# Patient Record
Sex: FEMALE | Race: WHITE | Employment: OTHER | URBAN - NONMETROPOLITAN AREA
[De-identification: names, ages, dates, MRNs, and addresses within clinical notes are randomized per-mention and may not be internally consistent; named-entity substitution may affect disease eponyms.]

---

## 2017-01-01 ENCOUNTER — HOSPITAL ENCOUNTER (OUTPATIENT)
Dept: MAMMOGRAPHY | Age: 82
Discharge: HOME | End: 2017-08-16
Payer: MEDICARE

## 2017-01-01 ENCOUNTER — CARE COORDINATION (OUTPATIENT)
Dept: CARE COORDINATION | Age: 82
End: 2017-01-01

## 2017-01-01 ENCOUNTER — HOSPITAL ENCOUNTER (OUTPATIENT)
Age: 82
Discharge: HOME | End: 2017-09-14
Payer: MEDICARE

## 2017-01-01 ENCOUNTER — HOSPITAL ENCOUNTER (OUTPATIENT)
Dept: GENERAL RADIOLOGY | Age: 82
Discharge: HOME | End: 2017-09-14
Payer: MEDICARE

## 2017-01-01 ENCOUNTER — TELEPHONE (OUTPATIENT)
Dept: FAMILY MEDICINE CLINIC | Age: 82
End: 2017-01-01

## 2017-01-01 ENCOUNTER — OFFICE VISIT (OUTPATIENT)
Dept: FAMILY MEDICINE CLINIC | Age: 82
End: 2017-01-01
Payer: MEDICARE

## 2017-01-01 ENCOUNTER — NURSE ONLY (OUTPATIENT)
Dept: FAMILY MEDICINE CLINIC | Age: 82
End: 2017-01-01
Payer: MEDICARE

## 2017-01-01 ENCOUNTER — OFFICE VISIT (OUTPATIENT)
Dept: FAMILY MEDICINE CLINIC | Age: 82
End: 2017-01-01

## 2017-01-01 ENCOUNTER — HOSPITAL ENCOUNTER (OUTPATIENT)
Age: 82
Discharge: HOME | End: 2017-08-16
Payer: MEDICARE

## 2017-01-01 VITALS
HEIGHT: 61 IN | RESPIRATION RATE: 16 BRPM | HEART RATE: 60 BPM | SYSTOLIC BLOOD PRESSURE: 124 MMHG | WEIGHT: 145.6 LBS | DIASTOLIC BLOOD PRESSURE: 70 MMHG | BODY MASS INDEX: 27.49 KG/M2

## 2017-01-01 VITALS — OXYGEN SATURATION: 95 % | HEART RATE: 70 BPM | RESPIRATION RATE: 18 BRPM

## 2017-01-01 VITALS
WEIGHT: 145 LBS | HEART RATE: 65 BPM | OXYGEN SATURATION: 87 % | HEIGHT: 61 IN | RESPIRATION RATE: 16 BRPM | SYSTOLIC BLOOD PRESSURE: 124 MMHG | DIASTOLIC BLOOD PRESSURE: 80 MMHG | BODY MASS INDEX: 27.38 KG/M2 | TEMPERATURE: 98.8 F

## 2017-01-01 VITALS
TEMPERATURE: 98 F | RESPIRATION RATE: 17 BRPM | OXYGEN SATURATION: 92 % | SYSTOLIC BLOOD PRESSURE: 112 MMHG | BODY MASS INDEX: 26.81 KG/M2 | DIASTOLIC BLOOD PRESSURE: 60 MMHG | WEIGHT: 142 LBS | HEART RATE: 56 BPM

## 2017-01-01 VITALS — RESPIRATION RATE: 18 BRPM | HEART RATE: 64 BPM | DIASTOLIC BLOOD PRESSURE: 70 MMHG | SYSTOLIC BLOOD PRESSURE: 140 MMHG

## 2017-01-01 VITALS
BODY MASS INDEX: 28.51 KG/M2 | HEIGHT: 61 IN | SYSTOLIC BLOOD PRESSURE: 128 MMHG | RESPIRATION RATE: 14 BRPM | DIASTOLIC BLOOD PRESSURE: 80 MMHG | WEIGHT: 151 LBS | HEART RATE: 60 BPM

## 2017-01-01 DIAGNOSIS — J01.11 ACUTE RECURRENT FRONTAL SINUSITIS: ICD-10-CM

## 2017-01-01 DIAGNOSIS — F01.50 VASCULAR DEMENTIA WITHOUT BEHAVIORAL DISTURBANCE (HCC): ICD-10-CM

## 2017-01-01 DIAGNOSIS — E78.00 PURE HYPERCHOLESTEROLEMIA: ICD-10-CM

## 2017-01-01 DIAGNOSIS — Z95.820 S/P ANGIOPLASTY WITH STENT: ICD-10-CM

## 2017-01-01 DIAGNOSIS — Z12.39 BREAST CANCER SCREENING: ICD-10-CM

## 2017-01-01 DIAGNOSIS — R79.89 ELEVATED TSH: ICD-10-CM

## 2017-01-01 DIAGNOSIS — N28.9 RENAL DYSFUNCTION: ICD-10-CM

## 2017-01-01 DIAGNOSIS — J40 BRONCHITIS: ICD-10-CM

## 2017-01-01 DIAGNOSIS — Z95.5 PRESENCE OF STENT IN LAD CORONARY ARTERY: ICD-10-CM

## 2017-01-01 DIAGNOSIS — J40 BRONCHITIS: Primary | ICD-10-CM

## 2017-01-01 DIAGNOSIS — I10 HTN (HYPERTENSION), BENIGN: ICD-10-CM

## 2017-01-01 DIAGNOSIS — I10 HTN (HYPERTENSION), BENIGN: Primary | ICD-10-CM

## 2017-01-01 DIAGNOSIS — J18.9 PNEUMONIA OF LEFT LOWER LOBE DUE TO INFECTIOUS ORGANISM: Primary | ICD-10-CM

## 2017-01-01 DIAGNOSIS — R79.81 ABNORMAL PULSE OXIMETRY: ICD-10-CM

## 2017-01-01 DIAGNOSIS — Z78.0 POSTMENOPAUSAL: ICD-10-CM

## 2017-01-01 DIAGNOSIS — I65.23 BILATERAL CAROTID ARTERY STENOSIS: ICD-10-CM

## 2017-01-01 DIAGNOSIS — D64.9 ANEMIA, UNSPECIFIED TYPE: Primary | ICD-10-CM

## 2017-01-01 DIAGNOSIS — L82.1 SEBORRHEIC KERATOSES: Primary | ICD-10-CM

## 2017-01-01 LAB
ALBUMIN SERPL-MCNC: 3.9 G/DL (ref 3.5–5.1)
ALP BLD-CCNC: 75 U/L (ref 38–126)
ALT SERPL-CCNC: 10 U/L (ref 11–66)
ANION GAP SERPL CALCULATED.3IONS-SCNC: 13 MEQ/L (ref 8–16)
AST SERPL-CCNC: 17 U/L (ref 5–40)
BILIRUB SERPL-MCNC: 0.4 MG/DL (ref 0.3–1.2)
BUN BLDV-MCNC: 24 MG/DL (ref 7–22)
CALCIUM SERPL-MCNC: 8.8 MG/DL (ref 8.5–10.5)
CHLORIDE BLD-SCNC: 99 MEQ/L (ref 98–111)
CO2: 26 MEQ/L (ref 23–33)
CREAT SERPL-MCNC: 1 MG/DL (ref 0.4–1.2)
GFR SERPL CREATININE-BSD FRML MDRD: 53 ML/MIN/1.73M2
GLUCOSE BLD-MCNC: 71 MG/DL (ref 70–108)
HCT VFR BLD CALC: 30.9 % (ref 37–47)
HEMOGLOBIN: 10 GM/DL (ref 12–16)
MCH RBC QN AUTO: 25.1 PG (ref 27–31)
MCHC RBC AUTO-ENTMCNC: 32.3 GM/DL (ref 33–37)
MCV RBC AUTO: 77.7 FL (ref 81–99)
PDW BLD-RTO: 18.4 % (ref 11.5–14.5)
PLATELET # BLD: 222 THOU/MM3 (ref 130–400)
PMV BLD AUTO: 9.8 MCM (ref 7.4–10.4)
POTASSIUM SERPL-SCNC: 4.5 MEQ/L (ref 3.5–5.2)
RBC # BLD: 3.97 MILL/MM3 (ref 4.2–5.4)
SODIUM BLD-SCNC: 138 MEQ/L (ref 135–145)
T4 FREE: 1.33 NG/DL (ref 0.93–1.76)
TOTAL PROTEIN: 6.4 G/DL (ref 6.1–8)
TSH SERPL DL<=0.05 MIU/L-ACNC: 5.53 UIU/ML (ref 0.4–4.2)
WBC # BLD: 6 THOU/MM3 (ref 4.8–10.8)

## 2017-01-01 PROCEDURE — 1036F TOBACCO NON-USER: CPT | Performed by: FAMILY MEDICINE

## 2017-01-01 PROCEDURE — G8419 CALC BMI OUT NRM PARAM NOF/U: HCPCS | Performed by: FAMILY MEDICINE

## 2017-01-01 PROCEDURE — G8427 DOCREV CUR MEDS BY ELIG CLIN: HCPCS | Performed by: FAMILY MEDICINE

## 2017-01-01 PROCEDURE — 71020 XR CHEST STANDARD TWO VW: CPT

## 2017-01-01 PROCEDURE — G8400 PT W/DXA NO RESULTS DOC: HCPCS | Performed by: FAMILY MEDICINE

## 2017-01-01 PROCEDURE — 99213 OFFICE O/P EST LOW 20 MIN: CPT | Performed by: FAMILY MEDICINE

## 2017-01-01 PROCEDURE — 99211 OFF/OP EST MAY X REQ PHY/QHP: CPT | Performed by: FAMILY MEDICINE

## 2017-01-01 PROCEDURE — 1123F ACP DISCUSS/DSCN MKR DOCD: CPT | Performed by: FAMILY MEDICINE

## 2017-01-01 PROCEDURE — 4040F PNEUMOC VAC/ADMIN/RCVD: CPT | Performed by: FAMILY MEDICINE

## 2017-01-01 PROCEDURE — G0202 SCR MAMMO BI INCL CAD: HCPCS

## 2017-01-01 PROCEDURE — 1090F PRES/ABSN URINE INCON ASSESS: CPT | Performed by: FAMILY MEDICINE

## 2017-01-01 PROCEDURE — G8598 ASA/ANTIPLAT THER USED: HCPCS | Performed by: FAMILY MEDICINE

## 2017-01-01 PROCEDURE — 36415 COLL VENOUS BLD VENIPUNCTURE: CPT

## 2017-01-01 PROCEDURE — 94640 AIRWAY INHALATION TREATMENT: CPT | Performed by: FAMILY MEDICINE

## 2017-01-01 PROCEDURE — 85027 COMPLETE CBC AUTOMATED: CPT

## 2017-01-01 PROCEDURE — 84443 ASSAY THYROID STIM HORMONE: CPT

## 2017-01-01 PROCEDURE — 99214 OFFICE O/P EST MOD 30 MIN: CPT | Performed by: FAMILY MEDICINE

## 2017-01-01 PROCEDURE — 17110 DESTRUCTION B9 LES UP TO 14: CPT | Performed by: FAMILY MEDICINE

## 2017-01-01 PROCEDURE — 84439 ASSAY OF FREE THYROXINE: CPT

## 2017-01-01 PROCEDURE — 80053 COMPREHEN METABOLIC PANEL: CPT

## 2017-01-01 PROCEDURE — 96372 THER/PROPH/DIAG INJ SC/IM: CPT | Performed by: FAMILY MEDICINE

## 2017-01-01 RX ORDER — ALBUTEROL SULFATE 2.5 MG/3ML
2.5 SOLUTION RESPIRATORY (INHALATION) ONCE
Status: COMPLETED | OUTPATIENT
Start: 2017-01-01 | End: 2017-01-01

## 2017-01-01 RX ORDER — LEVOFLOXACIN 250 MG/1
250 TABLET ORAL DAILY
Qty: 10 TABLET | Refills: 0 | Status: SHIPPED | OUTPATIENT
Start: 2017-01-01 | End: 2017-01-01 | Stop reason: ALTCHOICE

## 2017-01-01 RX ORDER — QUETIAPINE FUMARATE 100 MG/1
TABLET, FILM COATED ORAL
Qty: 30 TABLET | Refills: 1 | Status: SHIPPED | OUTPATIENT
Start: 2017-01-01 | End: 2017-01-01 | Stop reason: SDUPTHER

## 2017-01-01 RX ORDER — CEFTRIAXONE 1 G/1
1 INJECTION, POWDER, FOR SOLUTION INTRAMUSCULAR; INTRAVENOUS ONCE
Status: COMPLETED | OUTPATIENT
Start: 2017-01-01 | End: 2017-01-01

## 2017-01-01 RX ORDER — LISINOPRIL 20 MG/1
20 TABLET ORAL DAILY
Qty: 30 TABLET | Refills: 11 | Status: SHIPPED | OUTPATIENT
Start: 2017-01-01 | End: 2018-01-01 | Stop reason: SDUPTHER

## 2017-01-01 RX ORDER — ALPRAZOLAM 1 MG/1
0.5 TABLET ORAL 3 TIMES DAILY PRN
Qty: 30 TABLET | Refills: 0 | Status: ON HOLD | OUTPATIENT
Start: 2017-01-01 | End: 2018-01-01

## 2017-01-01 RX ORDER — ALPRAZOLAM 1 MG/1
0.5 TABLET ORAL 3 TIMES DAILY PRN
Qty: 30 TABLET | Refills: 0 | Status: SHIPPED | OUTPATIENT
Start: 2017-01-01 | End: 2017-01-01 | Stop reason: SDUPTHER

## 2017-01-01 RX ORDER — TRIAMCINOLONE ACETONIDE 40 MG/ML
40 INJECTION, SUSPENSION INTRA-ARTICULAR; INTRAMUSCULAR ONCE
Status: COMPLETED | OUTPATIENT
Start: 2017-01-01 | End: 2017-01-01

## 2017-01-01 RX ORDER — CLOPIDOGREL BISULFATE 75 MG/1
TABLET ORAL
Qty: 90 TABLET | Refills: 0 | Status: ON HOLD | OUTPATIENT
Start: 2017-01-01 | End: 2018-01-01 | Stop reason: HOSPADM

## 2017-01-01 RX ORDER — ISOSORBIDE MONONITRATE 30 MG/1
TABLET, EXTENDED RELEASE ORAL
Qty: 90 TABLET | Refills: 3 | Status: ON HOLD | OUTPATIENT
Start: 2017-01-01 | End: 2018-01-01

## 2017-01-01 RX ORDER — LORATADINE 10 MG/1
10 TABLET ORAL DAILY
Qty: 30 TABLET | Refills: 11 | Status: ON HOLD | OUTPATIENT
Start: 2017-01-01 | End: 2018-01-01

## 2017-01-01 RX ORDER — PREDNISONE 20 MG/1
40 TABLET ORAL DAILY
Qty: 10 TABLET | Refills: 0 | Status: SHIPPED | OUTPATIENT
Start: 2017-01-01 | End: 2017-01-01

## 2017-01-01 RX ADMIN — Medication 0.5 MG: at 15:38

## 2017-01-01 RX ADMIN — TRIAMCINOLONE ACETONIDE 40 MG: 40 INJECTION, SUSPENSION INTRA-ARTICULAR; INTRAMUSCULAR at 15:36

## 2017-01-01 RX ADMIN — CEFTRIAXONE 1 G: 1 INJECTION, POWDER, FOR SOLUTION INTRAMUSCULAR; INTRAVENOUS at 15:35

## 2017-01-01 RX ADMIN — ALBUTEROL SULFATE 2.5 MG: 2.5 SOLUTION RESPIRATORY (INHALATION) at 15:37

## 2017-01-31 ENCOUNTER — CARE COORDINATION (OUTPATIENT)
Dept: CARE COORDINATION | Age: 82
End: 2017-01-31

## 2017-02-07 ENCOUNTER — TELEPHONE (OUTPATIENT)
Dept: FAMILY MEDICINE CLINIC | Age: 82
End: 2017-02-07

## 2017-03-03 ENCOUNTER — CARE COORDINATION (OUTPATIENT)
Dept: CARE COORDINATION | Age: 82
End: 2017-03-03

## 2017-04-06 ENCOUNTER — CARE COORDINATION (OUTPATIENT)
Dept: CARE COORDINATION | Age: 82
End: 2017-04-06

## 2017-10-26 NOTE — TELEPHONE ENCOUNTER
Norma Canales calls complaining of being constipated for a week. She has tried OTC stool softeners and nothing is helping. She is asking for help. Please advise.     [address]  9/22/17

## 2017-11-02 NOTE — TELEPHONE ENCOUNTER
Lisa Arias calls stating for a couple weeks she has not been able to move her bowels. She said she has been taking Miralax and it is not doing anything and she cannot even \"dig it out\". She feels so full and is worried she may have a blocked bowel. She was willing to come in today but only wanted to see Dr Mar Morning (not in office). Please advise.

## 2017-12-05 NOTE — CARE COORDINATION
Attempted to reach patient for continued Care Coordination follow up and education. Patient was unavailable at the time of my call, and generic voicemail message was left asking patient to please return call to my direct number.   Jeane Herndon RN Care Coordinator

## 2017-12-15 NOTE — PROGRESS NOTES
Chief Complaint   Patient presents with    Blood Pressure Check       Vitals:    12/15/17 1133   BP: (!) 140/70   Pulse:    Resp:        No changes to med  If dizzy over the weekend then go to the ER  Call patient

## 2018-01-01 ENCOUNTER — ANESTHESIA EVENT (OUTPATIENT)
Dept: EMERGENCY DEPT | Age: 83
DRG: 023 | End: 2018-01-01
Payer: MEDICARE

## 2018-01-01 ENCOUNTER — HOSPITAL ENCOUNTER (INPATIENT)
Age: 83
LOS: 2 days | Discharge: HOSPICE | DRG: 023 | End: 2018-05-03
Attending: EMERGENCY MEDICINE | Admitting: INTERNAL MEDICINE
Payer: MEDICARE

## 2018-01-01 ENCOUNTER — CARE COORDINATION (OUTPATIENT)
Dept: CARE COORDINATION | Age: 83
End: 2018-01-01

## 2018-01-01 ENCOUNTER — NURSE ONLY (OUTPATIENT)
Dept: FAMILY MEDICINE CLINIC | Age: 83
End: 2018-01-01
Payer: MEDICARE

## 2018-01-01 ENCOUNTER — APPOINTMENT (OUTPATIENT)
Dept: GENERAL RADIOLOGY | Age: 83
DRG: 023 | End: 2018-01-01
Payer: MEDICARE

## 2018-01-01 ENCOUNTER — NURSE TRIAGE (OUTPATIENT)
Dept: ADMINISTRATIVE | Age: 83
End: 2018-01-01

## 2018-01-01 ENCOUNTER — HOSPITAL ENCOUNTER (INPATIENT)
Age: 83
LOS: 1 days | DRG: 066 | End: 2018-05-04
Attending: INTERNAL MEDICINE | Admitting: INTERNAL MEDICINE
Payer: MEDICARE

## 2018-01-01 ENCOUNTER — APPOINTMENT (OUTPATIENT)
Dept: CT IMAGING | Age: 83
DRG: 023 | End: 2018-01-01
Payer: MEDICARE

## 2018-01-01 ENCOUNTER — OFFICE VISIT (OUTPATIENT)
Dept: FAMILY MEDICINE CLINIC | Age: 83
End: 2018-01-01
Payer: MEDICARE

## 2018-01-01 ENCOUNTER — APPOINTMENT (OUTPATIENT)
Dept: MRI IMAGING | Age: 83
DRG: 377 | End: 2018-01-01
Payer: MEDICARE

## 2018-01-01 ENCOUNTER — APPOINTMENT (OUTPATIENT)
Dept: MRI IMAGING | Age: 83
DRG: 023 | End: 2018-01-01
Payer: MEDICARE

## 2018-01-01 ENCOUNTER — ANESTHESIA (OUTPATIENT)
Dept: ENDOSCOPY | Age: 83
DRG: 377 | End: 2018-01-01
Payer: MEDICARE

## 2018-01-01 ENCOUNTER — HOSPITAL ENCOUNTER (OUTPATIENT)
Dept: NON INVASIVE DIAGNOSTICS | Age: 83
Discharge: HOME | End: 2018-02-02
Payer: MEDICARE

## 2018-01-01 ENCOUNTER — HOSPITAL ENCOUNTER (INPATIENT)
Age: 83
LOS: 3 days | Discharge: HOME | DRG: 377 | End: 2018-01-12
Attending: FAMILY MEDICINE | Admitting: INTERNAL MEDICINE
Payer: MEDICARE

## 2018-01-01 ENCOUNTER — TELEPHONE (OUTPATIENT)
Dept: FAMILY MEDICINE CLINIC | Age: 83
End: 2018-01-01

## 2018-01-01 ENCOUNTER — APPOINTMENT (OUTPATIENT)
Dept: GENERAL RADIOLOGY | Age: 83
DRG: 377 | End: 2018-01-01
Payer: MEDICARE

## 2018-01-01 ENCOUNTER — OFFICE VISIT (OUTPATIENT)
Dept: CARDIOLOGY CLINIC | Age: 83
End: 2018-01-01
Payer: MEDICARE

## 2018-01-01 ENCOUNTER — ANESTHESIA (OUTPATIENT)
Dept: EMERGENCY DEPT | Age: 83
DRG: 023 | End: 2018-01-01
Payer: MEDICARE

## 2018-01-01 ENCOUNTER — HOSPITAL ENCOUNTER (OUTPATIENT)
Dept: INTERVENTIONAL RADIOLOGY/VASCULAR | Age: 83
Discharge: HOME | End: 2018-02-02
Payer: MEDICARE

## 2018-01-01 ENCOUNTER — APPOINTMENT (OUTPATIENT)
Dept: CT IMAGING | Age: 83
DRG: 377 | End: 2018-01-01
Payer: MEDICARE

## 2018-01-01 ENCOUNTER — ANESTHESIA EVENT (OUTPATIENT)
Dept: ENDOSCOPY | Age: 83
DRG: 377 | End: 2018-01-01
Payer: MEDICARE

## 2018-01-01 VITALS
HEART RATE: 86 BPM | BODY MASS INDEX: 25.61 KG/M2 | TEMPERATURE: 97.5 F | DIASTOLIC BLOOD PRESSURE: 38 MMHG | WEIGHT: 140 LBS | RESPIRATION RATE: 21 BRPM | OXYGEN SATURATION: 80 % | SYSTOLIC BLOOD PRESSURE: 141 MMHG

## 2018-01-01 VITALS
HEART RATE: 72 BPM | SYSTOLIC BLOOD PRESSURE: 150 MMHG | WEIGHT: 147 LBS | HEIGHT: 62 IN | DIASTOLIC BLOOD PRESSURE: 80 MMHG | BODY MASS INDEX: 27.05 KG/M2

## 2018-01-01 VITALS
DIASTOLIC BLOOD PRESSURE: 72 MMHG | HEART RATE: 62 BPM | BODY MASS INDEX: 24.7 KG/M2 | SYSTOLIC BLOOD PRESSURE: 180 MMHG | RESPIRATION RATE: 16 BRPM | WEIGHT: 148.4 LBS

## 2018-01-01 VITALS
DIASTOLIC BLOOD PRESSURE: 60 MMHG | BODY MASS INDEX: 25.87 KG/M2 | SYSTOLIC BLOOD PRESSURE: 110 MMHG | HEART RATE: 60 BPM | HEIGHT: 63 IN | WEIGHT: 146 LBS

## 2018-01-01 VITALS
HEART RATE: 61 BPM | SYSTOLIC BLOOD PRESSURE: 164 MMHG | WEIGHT: 142 LBS | BODY MASS INDEX: 23.66 KG/M2 | OXYGEN SATURATION: 95 % | HEIGHT: 65 IN | TEMPERATURE: 98.2 F | DIASTOLIC BLOOD PRESSURE: 70 MMHG | RESPIRATION RATE: 16 BRPM

## 2018-01-01 VITALS — OXYGEN SATURATION: 98 % | SYSTOLIC BLOOD PRESSURE: 132 MMHG | DIASTOLIC BLOOD PRESSURE: 74 MMHG

## 2018-01-01 VITALS
SYSTOLIC BLOOD PRESSURE: 130 MMHG | BODY MASS INDEX: 27.07 KG/M2 | DIASTOLIC BLOOD PRESSURE: 80 MMHG | WEIGHT: 148 LBS | HEART RATE: 72 BPM | OXYGEN SATURATION: 18 %

## 2018-01-01 VITALS
OXYGEN SATURATION: 85 % | TEMPERATURE: 97.4 F | RESPIRATION RATE: 20 BRPM | DIASTOLIC BLOOD PRESSURE: 45 MMHG | HEART RATE: 72 BPM | SYSTOLIC BLOOD PRESSURE: 81 MMHG

## 2018-01-01 VITALS — HEART RATE: 64 BPM | DIASTOLIC BLOOD PRESSURE: 70 MMHG | SYSTOLIC BLOOD PRESSURE: 158 MMHG | RESPIRATION RATE: 16 BRPM

## 2018-01-01 DIAGNOSIS — R94.31 EKG ABNORMALITIES: ICD-10-CM

## 2018-01-01 DIAGNOSIS — I10 HTN (HYPERTENSION), BENIGN: ICD-10-CM

## 2018-01-01 DIAGNOSIS — N30.00 ACUTE CYSTITIS WITHOUT HEMATURIA: ICD-10-CM

## 2018-01-01 DIAGNOSIS — Z95.820 S/P ANGIOPLASTY WITH STENT: ICD-10-CM

## 2018-01-01 DIAGNOSIS — N39.0 URINARY TRACT INFECTION WITHOUT HEMATURIA, SITE UNSPECIFIED: ICD-10-CM

## 2018-01-01 DIAGNOSIS — N28.9 RENAL DYSFUNCTION: ICD-10-CM

## 2018-01-01 DIAGNOSIS — R73.9 HYPERGLYCEMIA: ICD-10-CM

## 2018-01-01 DIAGNOSIS — I49.8 BRADYARRHYTHMIA: ICD-10-CM

## 2018-01-01 DIAGNOSIS — D50.8 OTHER IRON DEFICIENCY ANEMIA: ICD-10-CM

## 2018-01-01 DIAGNOSIS — H61.23 IMPACTED CERUMEN, BILATERAL: ICD-10-CM

## 2018-01-01 DIAGNOSIS — E03.9 ACQUIRED HYPOTHYROIDISM: ICD-10-CM

## 2018-01-01 DIAGNOSIS — I63.512 CEREBROVASCULAR ACCIDENT (CVA) DUE TO OCCLUSION OF LEFT MIDDLE CEREBRAL ARTERY (HCC): Primary | ICD-10-CM

## 2018-01-01 DIAGNOSIS — K25.4 GASTROINTESTINAL HEMORRHAGE ASSOCIATED WITH GASTRIC ULCER: ICD-10-CM

## 2018-01-01 DIAGNOSIS — G45.3 AMAUROSIS FUGAX: ICD-10-CM

## 2018-01-01 DIAGNOSIS — K92.2 GASTROINTESTINAL HEMORRHAGE, UNSPECIFIED GASTROINTESTINAL HEMORRHAGE TYPE: Primary | ICD-10-CM

## 2018-01-01 DIAGNOSIS — I65.23 BILATERAL CAROTID ARTERY STENOSIS: ICD-10-CM

## 2018-01-01 DIAGNOSIS — I25.10 CORONARY ARTERY DISEASE INVOLVING NATIVE CORONARY ARTERY OF NATIVE HEART WITHOUT ANGINA PECTORIS: ICD-10-CM

## 2018-01-01 DIAGNOSIS — K25.4 GASTROINTESTINAL HEMORRHAGE ASSOCIATED WITH GASTRIC ULCER: Primary | ICD-10-CM

## 2018-01-01 DIAGNOSIS — K92.0 GASTROINTESTINAL HEMORRHAGE WITH HEMATEMESIS: Primary | ICD-10-CM

## 2018-01-01 DIAGNOSIS — Z01.89 ROUTINE LAB DRAW: Primary | ICD-10-CM

## 2018-01-01 DIAGNOSIS — D50.0 BLOOD LOSS ANEMIA: ICD-10-CM

## 2018-01-01 DIAGNOSIS — I10 HYPERTENSION, UNCONTROLLED: ICD-10-CM

## 2018-01-01 DIAGNOSIS — I25.10 CORONARY ARTERY DISEASE INVOLVING NATIVE CORONARY ARTERY OF NATIVE HEART WITHOUT ANGINA PECTORIS: Primary | ICD-10-CM

## 2018-01-01 DIAGNOSIS — E78.00 PURE HYPERCHOLESTEROLEMIA: ICD-10-CM

## 2018-01-01 DIAGNOSIS — R41.0 CONFUSION: Primary | ICD-10-CM

## 2018-01-01 DIAGNOSIS — Z95.5 PRESENCE OF STENT IN LAD CORONARY ARTERY: Primary | ICD-10-CM

## 2018-01-01 LAB
ABO: NORMAL
ALBUMIN SERPL-MCNC: 2.5 GM/DL (ref 3.5–5)
ALBUMIN SERPL-MCNC: 2.8 G/DL (ref 3.5–5.1)
ALBUMIN SERPL-MCNC: 3.9 G/DL (ref 3.5–5.1)
ALBUMIN SERPL-MCNC: 4 G/DL (ref 3.5–5.1)
ALP BLD-CCNC: 52 U/L (ref 38–126)
ALP BLD-CCNC: 56 U/L (ref 46–116)
ALP BLD-CCNC: 63 U/L (ref 38–126)
ALP BLD-CCNC: 99 U/L (ref 38–126)
ALT SERPL-CCNC: 10 U/L (ref 11–66)
ALT SERPL-CCNC: 12 U/L (ref 11–66)
ALT SERPL-CCNC: 15 U/L (ref 12–78)
ALT SERPL-CCNC: 8 U/L (ref 11–66)
AMMONIA: 30 UMOL/L (ref 11–60)
AMORPHOUS: ABNORMAL
ANION GAP SERPL CALCULATED.3IONS-SCNC: 14 MEQ/L (ref 8–16)
ANION GAP SERPL CALCULATED.3IONS-SCNC: 15 MEQ/L (ref 8–16)
ANION GAP SERPL CALCULATED.3IONS-SCNC: 15 MEQ/L (ref 8–16)
ANION GAP: 7 MEQ/L (ref 8–16)
ANISOCYTOSIS: ABNORMAL
ANTIBODY SCREEN: NORMAL
APTT: 24 SECONDS (ref 22–38)
APTT: 26 SECONDS (ref 22–38)
APTT: 29.8 SECONDS (ref 22–38)
AST SERPL-CCNC: 13 U/L (ref 5–40)
AST SERPL-CCNC: 20 U/L (ref 5–40)
AST SERPL-CCNC: 21 U/L (ref 15–37)
AST SERPL-CCNC: 22 U/L (ref 5–40)
BACTERIA: ABNORMAL
BACTERIA: ABNORMAL /HPF
BASOPHILS # BLD: 0.3 %
BASOPHILS # BLD: 0.4 % (ref 0–3)
BASOPHILS # BLD: 0.5 %
BASOPHILS ABSOLUTE: 0 THOU/MM3 (ref 0–0.1)
BASOPHILS ABSOLUTE: 0 THOU/MM3 (ref 0–0.1)
BILIRUB SERPL-MCNC: 0.2 MG/DL (ref 0.3–1.2)
BILIRUB SERPL-MCNC: 0.3 MG/DL (ref 0.2–1)
BILIRUB SERPL-MCNC: 0.4 MG/DL (ref 0.3–1.2)
BILIRUB SERPL-MCNC: 1 MG/DL (ref 0.3–1.2)
BILIRUBIN DIRECT: < 0.2 MG/DL (ref 0–0.3)
BILIRUBIN DIRECT: < 0.2 MG/DL (ref 0–0.3)
BILIRUBIN URINE: ABNORMAL
BILIRUBIN URINE: NEGATIVE
BILIRUBIN URINE: NEGATIVE
BLOOD CULTURE, ROUTINE: NORMAL
BLOOD CULTURE, ROUTINE: NORMAL
BLOOD URINE, POC: NEGATIVE
BLOOD, URINE: ABNORMAL
BLOOD, URINE: ABNORMAL
BUN BLDV-MCNC: 15 MG/DL (ref 7–22)
BUN BLDV-MCNC: 19 MG/DL (ref 7–22)
BUN BLDV-MCNC: 21 MG/DL (ref 7–22)
BUN BLDV-MCNC: 26 MG/DL (ref 7–22)
BUN BLDV-MCNC: 30 MG/DL (ref 7–18)
CALCIUM IONIZED: 1.08 MMOL/L (ref 1.12–1.32)
CALCIUM SERPL-MCNC: 8 MG/DL (ref 8.5–10.5)
CALCIUM SERPL-MCNC: 8.8 MG/DL (ref 8.5–10.5)
CALCIUM SERPL-MCNC: 9.5 MG/DL (ref 8.5–10.5)
CALCIUM SERPL-MCNC: 9.7 MG/DL (ref 8.5–10.5)
CASTS 2: ABNORMAL /LPF
CASTS UA: ABNORMAL /LPF
CASTS UA: ABNORMAL /LPF
CHARACTER, URINE: ABNORMAL
CHARACTER, URINE: ABNORMAL
CHARACTER, URINE: CLEAR
CHLORIDE BLD-SCNC: 100 MEQ/L (ref 98–111)
CHLORIDE BLD-SCNC: 103 MEQ/L (ref 98–111)
CHLORIDE BLD-SCNC: 107 MEQ/L (ref 98–107)
CHLORIDE BLD-SCNC: 109 MEQ/L (ref 98–111)
CHLORIDE BLD-SCNC: 99 MEQ/L (ref 98–111)
CHOLESTEROL, TOTAL: 119 MG/DL (ref 100–199)
CO2: 20 MEQ/L (ref 23–33)
CO2: 25 MEQ/L (ref 23–33)
CO2: 26 MEQ/L (ref 23–33)
CO2: 27 MEQ/L (ref 21–32)
CO2: 27 MEQ/L (ref 23–33)
COLOR, URINE: ABNORMAL
COLOR: YELLOW
COLOR: YELLOW
CREAT SERPL-MCNC: 0.7 MG/DL (ref 0.4–1.2)
CREAT SERPL-MCNC: 0.8 MG/DL (ref 0.4–1.2)
CREAT SERPL-MCNC: 0.9 MG/DL (ref 0.4–1.2)
CREAT SERPL-MCNC: 1 MG/DL (ref 0.4–1.2)
CREAT SERPL-MCNC: 1 MG/DL (ref 0.6–1.1)
CRYSTALS, UA: ABNORMAL
CRYSTALS, UA: ABNORMAL
EKG ATRIAL RATE: 66 BPM
EKG ATRIAL RATE: 68 BPM
EKG ATRIAL RATE: 74 BPM
EKG P AXIS: 33 DEGREES
EKG P AXIS: 66 DEGREES
EKG P AXIS: 71 DEGREES
EKG P-R INTERVAL: 152 MS
EKG P-R INTERVAL: 156 MS
EKG P-R INTERVAL: 160 MS
EKG Q-T INTERVAL: 404 MS
EKG Q-T INTERVAL: 424 MS
EKG Q-T INTERVAL: 436 MS
EKG QRS DURATION: 82 MS
EKG QRS DURATION: 86 MS
EKG QRS DURATION: 90 MS
EKG QTC CALCULATION (BAZETT): 444 MS
EKG QTC CALCULATION (BAZETT): 448 MS
EKG QTC CALCULATION (BAZETT): 463 MS
EKG R AXIS: 13 DEGREES
EKG R AXIS: 30 DEGREES
EKG R AXIS: 4 DEGREES
EKG T AXIS: 53 DEGREES
EKG T AXIS: 62 DEGREES
EKG T AXIS: 68 DEGREES
EKG VENTRICULAR RATE: 66 BPM
EKG VENTRICULAR RATE: 68 BPM
EKG VENTRICULAR RATE: 74 BPM
EOSINOPHIL # BLD: 0.6 %
EOSINOPHIL # BLD: 1.7 %
EOSINOPHILS ABSOLUTE: 0.1 THOU/MM3 (ref 0–0.4)
EOSINOPHILS ABSOLUTE: 0.1 THOU/MM3 (ref 0–0.4)
EOSINOPHILS RELATIVE PERCENT: 3.9 % (ref 0–4)
EPITHELIAL CELLS, UA: ABNORMAL /HPF
EPITHELIAL CELLS, UA: ABNORMAL /HPF
GFR SERPL CREATININE-BSD FRML MDRD: 53 ML/MIN/1.73M2
GFR SERPL CREATININE-BSD FRML MDRD: 60 ML/MIN/1.73M2
GFR SERPL CREATININE-BSD FRML MDRD: 69 ML/MIN/1.73M2
GFR SERPL CREATININE-BSD FRML MDRD: 80 ML/MIN/1.73M2
GFR, ESTIMATED: 56 ML/MIN/1.73M2
GLUCOSE BLD-MCNC: 120 MG/DL (ref 70–108)
GLUCOSE BLD-MCNC: 125 MG/DL (ref 70–108)
GLUCOSE BLD-MCNC: 128 MG/DL (ref 70–108)
GLUCOSE BLD-MCNC: 130 MG/DL (ref 74–106)
GLUCOSE BLD-MCNC: 148 MG/DL (ref 70–108)
GLUCOSE BLD-MCNC: 171 MG/DL (ref 70–108)
GLUCOSE BLD-MCNC: 92 MG/DL (ref 70–108)
GLUCOSE URINE: NEGATIVE MG/DL
GLUCOSE URINE: NEGATIVE MG/DL
GLUCOSE, URINE: NEGATIVE MG/DL
HCT VFR BLD CALC: 21.5 % (ref 37–47)
HCT VFR BLD CALC: 21.6 % (ref 37–47)
HCT VFR BLD CALC: 27.1 % (ref 37–47)
HCT VFR BLD CALC: 27.5 % (ref 37–47)
HCT VFR BLD CALC: 28.2 % (ref 37–47)
HCT VFR BLD CALC: 28.4 % (ref 37–47)
HCT VFR BLD CALC: 28.4 % (ref 37–47)
HCT VFR BLD CALC: 29.1 % (ref 37–47)
HCT VFR BLD CALC: 29.4 % (ref 37–47)
HCT VFR BLD CALC: 29.7 % (ref 37–47)
HCT VFR BLD CALC: 30.9 % (ref 37–47)
HCT VFR BLD CALC: 31.4 % (ref 37–47)
HCT VFR BLD CALC: 32 % (ref 37–47)
HCT VFR BLD CALC: 36 % (ref 37–47)
HCT VFR BLD CALC: 40 % (ref 37–47)
HDLC SERPL-MCNC: 58 MG/DL
HEMOCCULT STL QL: POSITIVE
HEMOGLOBIN: 10 GM/DL (ref 12–16)
HEMOGLOBIN: 10.3 GM/DL (ref 12–16)
HEMOGLOBIN: 10.6 GM/DL (ref 12–16)
HEMOGLOBIN: 11.7 GM/DL (ref 12–16)
HEMOGLOBIN: 13.1 GM/DL (ref 12–16)
HEMOGLOBIN: 7 GM/DL (ref 12–16)
HEMOGLOBIN: 7.1 GM/DL (ref 12–16)
HEMOGLOBIN: 9 GM/DL (ref 12–16)
HEMOGLOBIN: 9.1 GM/DL (ref 12–16)
HEMOGLOBIN: 9.2 GM/DL (ref 12–16)
HEMOGLOBIN: 9.4 GM/DL (ref 12–16)
HEMOGLOBIN: 9.5 GM/DL (ref 12–16)
HEMOGLOBIN: 9.5 GM/DL (ref 12–16)
HEMOGLOBIN: 9.6 GM/DL (ref 12–16)
HEMOGLOBIN: 9.7 GM/DL (ref 12–16)
HEMOGLOBIN: 9.9 GM/DL (ref 12–16)
HYPOCHROMIA: ABNORMAL
INR BLD: 0.99 (ref 0.85–1.13)
INR BLD: 1.03 (ref 0.85–1.13)
INR BLD: 1.07 (ref 0.85–1.13)
IRON: 42 UG/DL (ref 50–170)
IRON: 47 UG/DL (ref 50–170)
KETONES, URINE: 15
KETONES, URINE: NEGATIVE
KETONES, URINE: NEGATIVE
LACTATE: 2.9 MMOL/L (ref 0.9–1.7)
LACTIC ACID: 1.1 MMOL/L (ref 0.5–2.2)
LDL CHOLESTEROL CALCULATED: 42 MG/DL
LEUKOCYTE CLUMPS, URINE: NEGATIVE
LEUKOCYTE ESTERASE, URINE: ABNORMAL
LEUKOCYTE ESTERASE, URINE: NEGATIVE
LV EF: 60 %
LVEF MODALITY: NORMAL
LYMPHOCYTES # BLD: 16.3 %
LYMPHOCYTES # BLD: 41.4 % (ref 15–47)
LYMPHOCYTES # BLD: 9.5 %
LYMPHOCYTES ABSOLUTE: 1 THOU/MM3 (ref 1–4.8)
LYMPHOCYTES ABSOLUTE: 1.1 THOU/MM3 (ref 1–4.8)
MCH RBC QN AUTO: 26.8 PG (ref 27–31)
MCH RBC QN AUTO: 27 PG (ref 27–31)
MCH RBC QN AUTO: 27.2 PG (ref 27–31)
MCH RBC QN AUTO: 27.4 PG (ref 27–31)
MCH RBC QN AUTO: 27.6 PG (ref 27–31)
MCH RBC QN AUTO: 28 PG (ref 27–31)
MCH RBC QN AUTO: 28.6 PG (ref 27–31)
MCH RBC QN AUTO: 28.8 PG (ref 27–31)
MCHC RBC AUTO-ENTMCNC: 32.2 GM/DL (ref 33–37)
MCHC RBC AUTO-ENTMCNC: 32.5 GM/DL (ref 33–37)
MCHC RBC AUTO-ENTMCNC: 32.5 GM/DL (ref 33–37)
MCHC RBC AUTO-ENTMCNC: 32.8 GM/DL (ref 33–37)
MCHC RBC AUTO-ENTMCNC: 32.9 GM/DL (ref 33–37)
MCHC RBC AUTO-ENTMCNC: 33 GM/DL (ref 33–37)
MCHC RBC AUTO-ENTMCNC: 33.2 GM/DL (ref 33–37)
MCHC RBC AUTO-ENTMCNC: 33.7 GM/DL (ref 33–37)
MCV RBC AUTO: 81.6 FL (ref 81–99)
MCV RBC AUTO: 82.9 FL (ref 81–99)
MCV RBC AUTO: 83.2 FL (ref 81–99)
MCV RBC AUTO: 84.5 FL (ref 81–99)
MCV RBC AUTO: 84.8 FL (ref 81–99)
MCV RBC AUTO: 85 FL (ref 81–99)
MCV RBC AUTO: 85.2 FL (ref 81–99)
MCV RBC AUTO: 87.4 FL (ref 81–99)
MISCELLANEOUS 2: ABNORMAL
MONOCYTES # BLD: 10 %
MONOCYTES # BLD: 6.4 %
MONOCYTES ABSOLUTE: 0.7 THOU/MM3 (ref 0.4–1.3)
MONOCYTES ABSOLUTE: 0.7 THOU/MM3 (ref 0.4–1.3)
MONOCYTES: 12 % (ref 0–12)
MUCUS: ABNORMAL
NITRITE, URINE: NEGATIVE
NITRITE, URINE: NEGATIVE
NITRITE, URINE: POSITIVE
NUCLEATED RED BLOOD CELLS: 0 /100 WBC
NUCLEATED RED BLOOD CELLS: 0 /100 WBC
ORGANISM: ABNORMAL
OSMOLALITY CALCULATION: 278.1 MOSMOL/KG (ref 275–300)
PDW BLD-RTO: 14.7 % (ref 11.5–14.5)
PDW BLD-RTO: 15.6 % (ref 11.5–14.5)
PDW BLD-RTO: 15.7 % (ref 11.5–14.5)
PDW BLD-RTO: 15.7 % (ref 11.5–14.5)
PDW BLD-RTO: 16 % (ref 11.5–14.5)
PDW BLD-RTO: 16.1 % (ref 11.5–14.5)
PDW BLD-RTO: 16.2 % (ref 11.5–14.5)
PDW BLD-RTO: 16.5 % (ref 11.5–14.5)
PH UA: 6.5 (ref 5–9)
PH UA: 7.5
PH, URINE: 6
PLATELET # BLD: 172 THOU/MM3 (ref 130–400)
PLATELET # BLD: 191 THOU/MM3 (ref 130–400)
PLATELET # BLD: 210 THOU/MM3 (ref 130–400)
PLATELET # BLD: 225 THOU/MM3 (ref 130–400)
PLATELET # BLD: 230 THOU/MM3 (ref 130–400)
PLATELET # BLD: 240 THOU/MM3 (ref 130–400)
PLATELET # BLD: 241 THOU/MM3 (ref 130–400)
PLATELET # BLD: 252 THOU/MM3 (ref 130–400)
PLATELET ESTIMATE: ABNORMAL
PMV BLD AUTO: 11.1 FL (ref 7.4–10.4)
PMV BLD AUTO: 8.1 MCM (ref 7.4–10.4)
PMV BLD AUTO: 8.8 MCM (ref 7.4–10.4)
PMV BLD AUTO: 8.9 MCM (ref 7.4–10.4)
PMV BLD AUTO: 9 MCM (ref 7.4–10.4)
PMV BLD AUTO: 9 MCM (ref 7.4–10.4)
PMV BLD AUTO: 9.3 MCM (ref 7.4–10.4)
PMV BLD AUTO: 9.8 FL (ref 7.4–10.4)
POC ACTIVATED CLOTTING TIME KAOLIN: 131 SECONDS (ref 1–150)
POC CALCIUM: 7.8 MG/DL (ref 8.5–10.1)
POIKILOCYTES: SLIGHT
POTASSIUM REFLEX MAGNESIUM: 3.8 MEQ/L (ref 3.5–5.2)
POTASSIUM SERPL-SCNC: 3.9 MEQ/L (ref 3.5–5.1)
POTASSIUM SERPL-SCNC: 3.9 MEQ/L (ref 3.5–5.2)
POTASSIUM SERPL-SCNC: 4 MEQ/L (ref 3.5–5.2)
POTASSIUM SERPL-SCNC: 4.2 MEQ/L (ref 3.5–5.2)
PROCALCITONIN: 0.09 NG/ML (ref 0.01–0.09)
PROTEIN UA: 30
PROTEIN UA: ABNORMAL MG/DL
PROTEIN, URINE: NEGATIVE MG/DL
RBC # BLD: 2.54 MILL/MM3 (ref 4.2–5.4)
RBC # BLD: 2.63 MILL/MM3 (ref 4.2–5.4)
RBC # BLD: 3.21 MILL/MM3 (ref 4.2–5.4)
RBC # BLD: 3.46 MILL/MM3 (ref 4.2–5.4)
RBC # BLD: 3.62 MILL/MM3 (ref 4.2–5.4)
RBC # BLD: 3.67 MILL/MM3 (ref 4.2–5.4)
RBC # BLD: 4.34 MILL/MM3 (ref 4.2–5.4)
RBC # BLD: 4.83 MILL/MM3 (ref 4.2–5.4)
RBC # BLD: ABNORMAL 10*6/UL
RBC UA: ABNORMAL /HPF
RBC URINE: ABNORMAL /HPF
REFLEX TO URINE C & S: ABNORMAL
RENAL EPITHELIAL, UA: ABNORMAL
RH FACTOR: NORMAL
SCAN OF BLOOD SMEAR: NORMAL
SEG NEUTROPHILS: 71.5 %
SEG NEUTROPHILS: 83.2 %
SEGMENTED NEUTROPHILS ABSOLUTE COUNT: 5 THOU/MM3 (ref 1.8–7.7)
SEGMENTED NEUTROPHILS ABSOLUTE COUNT: 8.5 THOU/MM3 (ref 1.8–7.7)
SEGS: 42.3 % (ref 43–75)
SODIUM BLD-SCNC: 138 MEQ/L (ref 135–145)
SODIUM BLD-SCNC: 141 MEQ/L (ref 136–145)
SODIUM BLD-SCNC: 142 MEQ/L (ref 135–145)
SODIUM BLD-SCNC: 142 MEQ/L (ref 135–145)
SODIUM BLD-SCNC: 144 MEQ/L (ref 135–145)
SPECIFIC GRAVITY UA: 1.02 (ref 1–1.03)
SPECIFIC GRAVITY, URINE: 1.01 (ref 1–1.03)
SPECIFIC GRAVITY, URINE: >= 1.03 (ref 1–1.03)
TOTAL PROTEIN: 4.6 G/DL (ref 6.1–8)
TOTAL PROTEIN: 5.3 GM/DL (ref 6.4–8.2)
TOTAL PROTEIN: 6.4 G/DL (ref 6.1–8)
TOTAL PROTEIN: 7.4 G/DL (ref 6.1–8)
TRIGL SERPL-MCNC: 95 MG/DL (ref 0–199)
TROPONIN I: 0.05 NG/ML
TROPONIN T: < 0.01 NG/ML
TSH SERPL DL<=0.05 MIU/L-ACNC: 0.88 UIU/ML (ref 0.4–4.2)
UREASE-CLO-C. PYLORI: NEGATIVE
URINE CULTURE REFLEX: ABNORMAL
UROBILINOGEN, URINE: 0.2 EU/DL
UROBILINOGEN, URINE: 0.2 EU/DL
UROBILINOGEN, URINE: 0.2 EU/DL (ref 0–1)
WBC # BLD: 10.2 THOU/MM3 (ref 4.8–10.8)
WBC # BLD: 3.9 THOU/MM3 (ref 4.8–10.8)
WBC # BLD: 4.3 THOU/MM3 (ref 4.8–10.8)
WBC # BLD: 4.7 THOU/MM3 (ref 4.8–10.8)
WBC # BLD: 5.2 THOU/MM3 (ref 4.8–10.8)
WBC # BLD: 5.3 THOU/MM3 (ref 4.8–10.8)
WBC # BLD: 5.6 THOU/MM3 (ref 4.8–10.8)
WBC # BLD: 7 THOU/MM3 (ref 4.8–10.8)
WBC UA: ABNORMAL /HPF
WBC UA: ABNORMAL /HPF
YEAST: ABNORMAL

## 2018-01-01 PROCEDURE — 85018 HEMOGLOBIN: CPT

## 2018-01-01 PROCEDURE — 2500000003 HC RX 250 WO HCPCS: Performed by: INTERNAL MEDICINE

## 2018-01-01 PROCEDURE — 2720000010 HC SURG SUPPLY STERILE

## 2018-01-01 PROCEDURE — 6360000002 HC RX W HCPCS

## 2018-01-01 PROCEDURE — 36415 COLL VENOUS BLD VENIPUNCTURE: CPT

## 2018-01-01 PROCEDURE — 3700000000 HC ANESTHESIA ATTENDED CARE: Performed by: INTERNAL MEDICINE

## 2018-01-01 PROCEDURE — 36223 PLACE CATH CAROTID/INOM ART: CPT | Performed by: PSYCHIATRY & NEUROLOGY

## 2018-01-01 PROCEDURE — 1200000003 HC TELEMETRY R&B

## 2018-01-01 PROCEDURE — 36430 TRANSFUSION BLD/BLD COMPNT: CPT

## 2018-01-01 PROCEDURE — 86850 RBC ANTIBODY SCREEN: CPT

## 2018-01-01 PROCEDURE — 93880 EXTRACRANIAL BILAT STUDY: CPT

## 2018-01-01 PROCEDURE — 2580000003 HC RX 258: Performed by: FAMILY MEDICINE

## 2018-01-01 PROCEDURE — 99232 SBSQ HOSP IP/OBS MODERATE 35: CPT | Performed by: INTERNAL MEDICINE

## 2018-01-01 PROCEDURE — P9016 RBC LEUKOCYTES REDUCED: HCPCS

## 2018-01-01 PROCEDURE — 93306 TTE W/DOPPLER COMPLETE: CPT

## 2018-01-01 PROCEDURE — C9113 INJ PANTOPRAZOLE SODIUM, VIA: HCPCS | Performed by: INTERNAL MEDICINE

## 2018-01-01 PROCEDURE — 85014 HEMATOCRIT: CPT

## 2018-01-01 PROCEDURE — 2580000003 HC RX 258: Performed by: PSYCHIATRY & NEUROLOGY

## 2018-01-01 PROCEDURE — 87186 SC STD MICRODIL/AGAR DIL: CPT

## 2018-01-01 PROCEDURE — 80076 HEPATIC FUNCTION PANEL: CPT

## 2018-01-01 PROCEDURE — 2500000003 HC RX 250 WO HCPCS: Performed by: SPECIALIST

## 2018-01-01 PROCEDURE — 99291 CRITICAL CARE FIRST HOUR: CPT | Performed by: INTERNAL MEDICINE

## 2018-01-01 PROCEDURE — 80061 LIPID PANEL: CPT

## 2018-01-01 PROCEDURE — 36415 COLL VENOUS BLD VENIPUNCTURE: CPT | Performed by: FAMILY MEDICINE

## 2018-01-01 PROCEDURE — 96360 HYDRATION IV INFUSION INIT: CPT

## 2018-01-01 PROCEDURE — 6370000000 HC RX 637 (ALT 250 FOR IP): Performed by: FAMILY MEDICINE

## 2018-01-01 PROCEDURE — 0DB68ZX EXCISION OF STOMACH, VIA NATURAL OR ARTIFICIAL OPENING ENDOSCOPIC, DIAGNOSTIC: ICD-10-PCS | Performed by: INTERNAL MEDICINE

## 2018-01-01 PROCEDURE — 6370000000 HC RX 637 (ALT 250 FOR IP): Performed by: INTERNAL MEDICINE

## 2018-01-01 PROCEDURE — 86677 HELICOBACTER PYLORI ANTIBODY: CPT

## 2018-01-01 PROCEDURE — 80048 BASIC METABOLIC PNL TOTAL CA: CPT

## 2018-01-01 PROCEDURE — 83605 ASSAY OF LACTIC ACID: CPT

## 2018-01-01 PROCEDURE — 36222 PLACE CATH CAROTID/INOM ART: CPT | Performed by: PSYCHIATRY & NEUROLOGY

## 2018-01-01 PROCEDURE — 71045 X-RAY EXAM CHEST 1 VIEW: CPT

## 2018-01-01 PROCEDURE — 84484 ASSAY OF TROPONIN QUANT: CPT

## 2018-01-01 PROCEDURE — A5120 SKIN BARRIER, WIPE OR SWAB: HCPCS

## 2018-01-01 PROCEDURE — 99285 EMERGENCY DEPT VISIT HI MDM: CPT

## 2018-01-01 PROCEDURE — 6360000002 HC RX W HCPCS: Performed by: INTERNAL MEDICINE

## 2018-01-01 PROCEDURE — 85610 PROTHROMBIN TIME: CPT

## 2018-01-01 PROCEDURE — 85730 THROMBOPLASTIN TIME PARTIAL: CPT

## 2018-01-01 PROCEDURE — 70551 MRI BRAIN STEM W/O DYE: CPT

## 2018-01-01 PROCEDURE — B31R1ZZ FLUOROSCOPY OF INTRACRANIAL ARTERIES USING LOW OSMOLAR CONTRAST: ICD-10-PCS | Performed by: PSYCHIATRY & NEUROLOGY

## 2018-01-01 PROCEDURE — 61645 PERQ ART M-THROMBECT &/NFS: CPT | Performed by: PSYCHIATRY & NEUROLOGY

## 2018-01-01 PROCEDURE — 93005 ELECTROCARDIOGRAM TRACING: CPT

## 2018-01-01 PROCEDURE — 82140 ASSAY OF AMMONIA: CPT

## 2018-01-01 PROCEDURE — 6360000002 HC RX W HCPCS: Performed by: PSYCHIATRY & NEUROLOGY

## 2018-01-01 PROCEDURE — B3171ZZ FLUOROSCOPY OF LEFT INTERNAL CAROTID ARTERY USING LOW OSMOLAR CONTRAST: ICD-10-PCS | Performed by: PSYCHIATRY & NEUROLOGY

## 2018-01-01 PROCEDURE — 70544 MR ANGIOGRAPHY HEAD W/O DYE: CPT

## 2018-01-01 PROCEDURE — 70450 CT HEAD/BRAIN W/O DYE: CPT

## 2018-01-01 PROCEDURE — 51701 INSERT BLADDER CATHETER: CPT

## 2018-01-01 PROCEDURE — 86900 BLOOD TYPING SEROLOGIC ABO: CPT

## 2018-01-01 PROCEDURE — 82948 REAGENT STRIP/BLOOD GLUCOSE: CPT

## 2018-01-01 PROCEDURE — 61635 INTRACRAN ANGIOPLSTY W/STENT: CPT

## 2018-01-01 PROCEDURE — 1200000000 HC SEMI PRIVATE

## 2018-01-01 PROCEDURE — 2000000000 HC ICU R&B

## 2018-01-01 PROCEDURE — 85027 COMPLETE CBC AUTOMATED: CPT

## 2018-01-01 PROCEDURE — 94003 VENT MGMT INPAT SUBQ DAY: CPT

## 2018-01-01 PROCEDURE — 03CG3ZZ EXTIRPATION OF MATTER FROM INTRACRANIAL ARTERY, PERCUTANEOUS APPROACH: ICD-10-PCS | Performed by: PSYCHIATRY & NEUROLOGY

## 2018-01-01 PROCEDURE — 99214 OFFICE O/P EST MOD 30 MIN: CPT | Performed by: FAMILY MEDICINE

## 2018-01-01 PROCEDURE — 87086 URINE CULTURE/COLONY COUNT: CPT

## 2018-01-01 PROCEDURE — C1769 GUIDE WIRE: HCPCS

## 2018-01-01 PROCEDURE — 6360000002 HC RX W HCPCS: Performed by: FAMILY MEDICINE

## 2018-01-01 PROCEDURE — 2700000000 HC OXYGEN THERAPY PER DAY

## 2018-01-01 PROCEDURE — 2580000003 HC RX 258: Performed by: INTERNAL MEDICINE

## 2018-01-01 PROCEDURE — B3121ZZ FLUOROSCOPY OF LEFT SUBCLAVIAN ARTERY USING LOW OSMOLAR CONTRAST: ICD-10-PCS | Performed by: PSYCHIATRY & NEUROLOGY

## 2018-01-01 PROCEDURE — 85347 COAGULATION TIME ACTIVATED: CPT

## 2018-01-01 PROCEDURE — 2500000003 HC RX 250 WO HCPCS: Performed by: ANESTHESIOLOGY

## 2018-01-01 PROCEDURE — 5A1945Z RESPIRATORY VENTILATION, 24-96 CONSECUTIVE HOURS: ICD-10-PCS | Performed by: PSYCHIATRY & NEUROLOGY

## 2018-01-01 PROCEDURE — 36223 PLACE CATH CAROTID/INOM ART: CPT

## 2018-01-01 PROCEDURE — 3430000000 HC RX DIAGNOSTIC RADIOPHARMACEUTICAL: Performed by: INTERNAL MEDICINE

## 2018-01-01 PROCEDURE — 74176 CT ABD & PELVIS W/O CONTRAST: CPT

## 2018-01-01 PROCEDURE — 94002 VENT MGMT INPAT INIT DAY: CPT

## 2018-01-01 PROCEDURE — A9500 TC99M SESTAMIBI: HCPCS | Performed by: INTERNAL MEDICINE

## 2018-01-01 PROCEDURE — 81003 URINALYSIS AUTO W/O SCOPE: CPT | Performed by: FAMILY MEDICINE

## 2018-01-01 PROCEDURE — C1887 CATHETER, GUIDING: HCPCS

## 2018-01-01 PROCEDURE — 82330 ASSAY OF CALCIUM: CPT

## 2018-01-01 PROCEDURE — 6360000002 HC RX W HCPCS: Performed by: ANESTHESIOLOGY

## 2018-01-01 PROCEDURE — 78452 HT MUSCLE IMAGE SPECT MULT: CPT

## 2018-01-01 PROCEDURE — 87077 CULTURE AEROBIC IDENTIFY: CPT

## 2018-01-01 PROCEDURE — C2628 CATHETER, OCCLUSION: HCPCS

## 2018-01-01 PROCEDURE — 86901 BLOOD TYPING SEROLOGIC RH(D): CPT

## 2018-01-01 PROCEDURE — 99213 OFFICE O/P EST LOW 20 MIN: CPT | Performed by: INTERNAL MEDICINE

## 2018-01-01 PROCEDURE — 86923 COMPATIBILITY TEST ELECTRIC: CPT

## 2018-01-01 PROCEDURE — 3700000001 HC ADD 15 MINUTES (ANESTHESIA): Performed by: INTERNAL MEDICINE

## 2018-01-01 PROCEDURE — 7100000000 HC PACU RECOVERY - FIRST 15 MIN: Performed by: INTERNAL MEDICINE

## 2018-01-01 PROCEDURE — 2780000010 HC IMPLANT OTHER

## 2018-01-01 PROCEDURE — 31500 INSERT EMERGENCY AIRWAY: CPT | Performed by: ANESTHESIOLOGY

## 2018-01-01 PROCEDURE — 037G3DZ DILATION OF INTRACRANIAL ARTERY WITH INTRALUMINAL DEVICE, PERCUTANEOUS APPROACH: ICD-10-PCS | Performed by: PSYCHIATRY & NEUROLOGY

## 2018-01-01 PROCEDURE — C1757 CATH, THROMBECTOMY/EMBOLECT: HCPCS

## 2018-01-01 PROCEDURE — 85025 COMPLETE CBC W/AUTO DIFF WBC: CPT

## 2018-01-01 PROCEDURE — 36215 PLACE CATHETER IN ARTERY: CPT

## 2018-01-01 PROCEDURE — 84145 PROCALCITONIN (PCT): CPT

## 2018-01-01 PROCEDURE — 6360000002 HC RX W HCPCS: Performed by: SPECIALIST

## 2018-01-01 PROCEDURE — 82272 OCCULT BLD FECES 1-3 TESTS: CPT

## 2018-01-01 PROCEDURE — 75710 ARTERY X-RAYS ARM/LEG: CPT

## 2018-01-01 PROCEDURE — 3609012400 HC EGD TRANSORAL BIOPSY SINGLE/MULTIPLE: Performed by: INTERNAL MEDICINE

## 2018-01-01 PROCEDURE — 93270 REMOTE 30 DAY ECG REV/REPORT: CPT

## 2018-01-01 PROCEDURE — 80053 COMPREHEN METABOLIC PANEL: CPT

## 2018-01-01 PROCEDURE — 82248 BILIRUBIN DIRECT: CPT

## 2018-01-01 PROCEDURE — C1894 INTRO/SHEATH, NON-LASER: HCPCS

## 2018-01-01 PROCEDURE — 81001 URINALYSIS AUTO W/SCOPE: CPT

## 2018-01-01 PROCEDURE — 0BH18EZ INSERTION OF ENDOTRACHEAL AIRWAY INTO TRACHEA, VIA NATURAL OR ARTIFICIAL OPENING ENDOSCOPIC: ICD-10-PCS | Performed by: PSYCHIATRY & NEUROLOGY

## 2018-01-01 PROCEDURE — 99223 1ST HOSP IP/OBS HIGH 75: CPT | Performed by: FAMILY MEDICINE

## 2018-01-01 PROCEDURE — 87040 BLOOD CULTURE FOR BACTERIA: CPT

## 2018-01-01 PROCEDURE — 6370000000 HC RX 637 (ALT 250 FOR IP)

## 2018-01-01 PROCEDURE — 36225 PLACE CATH SUBCLAVIAN ART: CPT | Performed by: PSYCHIATRY & NEUROLOGY

## 2018-01-01 PROCEDURE — 94640 AIRWAY INHALATION TREATMENT: CPT

## 2018-01-01 PROCEDURE — 7100000001 HC PACU RECOVERY - ADDTL 15 MIN: Performed by: INTERNAL MEDICINE

## 2018-01-01 PROCEDURE — 99239 HOSP IP/OBS DSCHRG MGMT >30: CPT | Performed by: INTERNAL MEDICINE

## 2018-01-01 PROCEDURE — 99496 TRANSJ CARE MGMT HIGH F2F 7D: CPT | Performed by: FAMILY MEDICINE

## 2018-01-01 PROCEDURE — 93017 CV STRESS TEST TRACING ONLY: CPT

## 2018-01-01 PROCEDURE — 99233 SBSQ HOSP IP/OBS HIGH 50: CPT | Performed by: INTERNAL MEDICINE

## 2018-01-01 PROCEDURE — 2500000003 HC RX 250 WO HCPCS: Performed by: PSYCHIATRY & NEUROLOGY

## 2018-01-01 PROCEDURE — 93005 ELECTROCARDIOGRAM TRACING: CPT | Performed by: EMERGENCY MEDICINE

## 2018-01-01 PROCEDURE — 2500000003 HC RX 250 WO HCPCS

## 2018-01-01 PROCEDURE — 36140 INTRO NDL ICATH UPR/LXTR ART: CPT

## 2018-01-01 PROCEDURE — 87184 SC STD DISK METHOD PER PLATE: CPT

## 2018-01-01 RX ORDER — PANTOPRAZOLE SODIUM 40 MG/1
40 TABLET, DELAYED RELEASE ORAL
Qty: 30 TABLET | Refills: 3 | Status: ON HOLD | OUTPATIENT
Start: 2018-01-01 | End: 2018-01-01

## 2018-01-01 RX ORDER — GLYCOPYRROLATE 0.2 MG/ML
0.2 INJECTION INTRAMUSCULAR; INTRAVENOUS EVERY 4 HOURS PRN
Status: CANCELLED | OUTPATIENT
Start: 2018-01-01

## 2018-01-01 RX ORDER — ONDANSETRON 2 MG/ML
4 INJECTION INTRAMUSCULAR; INTRAVENOUS EVERY 6 HOURS PRN
Status: DISCONTINUED | OUTPATIENT
Start: 2018-01-01 | End: 2018-01-01 | Stop reason: HOSPADM

## 2018-01-01 RX ORDER — MORPHINE SULFATE 4 MG/ML
3 INJECTION, SOLUTION INTRAMUSCULAR; INTRAVENOUS
Status: DISCONTINUED | OUTPATIENT
Start: 2018-01-01 | End: 2018-01-01

## 2018-01-01 RX ORDER — METOPROLOL SUCCINATE 50 MG/1
50 TABLET, EXTENDED RELEASE ORAL DAILY
Status: DISCONTINUED | OUTPATIENT
Start: 2018-01-01 | End: 2018-01-01

## 2018-01-01 RX ORDER — MORPHINE SULFATE/0.9% NACL/PF 1 MG/ML
SYRINGE (ML) INJECTION CONTINUOUS
Status: DISCONTINUED | OUTPATIENT
Start: 2018-01-01 | End: 2018-01-01 | Stop reason: HOSPADM

## 2018-01-01 RX ORDER — LEVOTHYROXINE SODIUM 0.1 MG/1
100 TABLET ORAL DAILY
Status: DISCONTINUED | OUTPATIENT
Start: 2018-01-01 | End: 2018-01-01

## 2018-01-01 RX ORDER — LEVOFLOXACIN 5 MG/ML
250 INJECTION, SOLUTION INTRAVENOUS
Status: DISCONTINUED | OUTPATIENT
Start: 2018-01-01 | End: 2018-01-01

## 2018-01-01 RX ORDER — LORAZEPAM 2 MG/ML
0.5 INJECTION INTRAMUSCULAR
Status: DISCONTINUED | OUTPATIENT
Start: 2018-01-01 | End: 2018-01-01 | Stop reason: HOSPADM

## 2018-01-01 RX ORDER — PROPOFOL 10 MG/ML
INJECTION, EMULSION INTRAVENOUS PRN
Status: DISCONTINUED | OUTPATIENT
Start: 2018-01-01 | End: 2018-01-01 | Stop reason: HOSPADM

## 2018-01-01 RX ORDER — PANTOPRAZOLE SODIUM 40 MG/10ML
40 INJECTION, POWDER, LYOPHILIZED, FOR SOLUTION INTRAVENOUS EVERY 8 HOURS
Status: DISCONTINUED | OUTPATIENT
Start: 2018-01-01 | End: 2018-01-01

## 2018-01-01 RX ORDER — 0.9 % SODIUM CHLORIDE 0.9 %
250 INTRAVENOUS SOLUTION INTRAVENOUS ONCE
Status: COMPLETED | OUTPATIENT
Start: 2018-01-01 | End: 2018-01-01

## 2018-01-01 RX ORDER — ATORVASTATIN CALCIUM 40 MG/1
40 TABLET, FILM COATED ORAL NIGHTLY
Status: DISCONTINUED | OUTPATIENT
Start: 2018-01-01 | End: 2018-01-01

## 2018-01-01 RX ORDER — ATORVASTATIN CALCIUM 10 MG/1
10 TABLET, FILM COATED ORAL NIGHTLY
Status: DISCONTINUED | OUTPATIENT
Start: 2018-01-01 | End: 2018-01-01 | Stop reason: HOSPADM

## 2018-01-01 RX ORDER — ONDANSETRON 2 MG/ML
4 INJECTION INTRAMUSCULAR; INTRAVENOUS EVERY 6 HOURS PRN
Status: CANCELLED | OUTPATIENT
Start: 2018-01-01

## 2018-01-01 RX ORDER — IPRATROPIUM BROMIDE AND ALBUTEROL SULFATE 2.5; .5 MG/3ML; MG/3ML
1 SOLUTION RESPIRATORY (INHALATION)
Status: DISCONTINUED | OUTPATIENT
Start: 2018-01-01 | End: 2018-01-01

## 2018-01-01 RX ORDER — ASPIRIN 300 MG/1
300 SUPPOSITORY RECTAL DAILY
Status: DISCONTINUED | OUTPATIENT
Start: 2018-01-01 | End: 2018-01-01

## 2018-01-01 RX ORDER — ONDANSETRON 2 MG/ML
4 INJECTION INTRAMUSCULAR; INTRAVENOUS EVERY 8 HOURS PRN
Status: DISCONTINUED | OUTPATIENT
Start: 2018-01-01 | End: 2018-01-01

## 2018-01-01 RX ORDER — LIDOCAINE HYDROCHLORIDE 20 MG/ML
INJECTION, SOLUTION INFILTRATION; PERINEURAL PRN
Status: DISCONTINUED | OUTPATIENT
Start: 2018-01-01 | End: 2018-01-01 | Stop reason: SDUPTHER

## 2018-01-01 RX ORDER — LISINOPRIL 20 MG/1
20 TABLET ORAL DAILY
Status: DISCONTINUED | OUTPATIENT
Start: 2018-01-01 | End: 2018-01-01 | Stop reason: HOSPADM

## 2018-01-01 RX ORDER — ISOSORBIDE MONONITRATE 30 MG/1
30 TABLET, EXTENDED RELEASE ORAL DAILY
Status: DISCONTINUED | OUTPATIENT
Start: 2018-01-01 | End: 2018-01-01 | Stop reason: HOSPADM

## 2018-01-01 RX ORDER — DEXMEDETOMIDINE HYDROCHLORIDE 4 UG/ML
0.2 INJECTION, SOLUTION INTRAVENOUS CONTINUOUS
Status: DISCONTINUED | OUTPATIENT
Start: 2018-01-01 | End: 2018-01-01 | Stop reason: SDUPTHER

## 2018-01-01 RX ORDER — SENNA PLUS 8.6 MG/1
1 TABLET ORAL 2 TIMES DAILY
Qty: 60 TABLET | Refills: 11 | Status: ON HOLD | OUTPATIENT
Start: 2018-01-01 | End: 2018-01-01

## 2018-01-01 RX ORDER — SODIUM CHLORIDE 0.9 % (FLUSH) 0.9 %
10 SYRINGE (ML) INJECTION PRN
Status: DISCONTINUED | OUTPATIENT
Start: 2018-01-01 | End: 2018-01-01 | Stop reason: HOSPADM

## 2018-01-01 RX ORDER — ACETAMINOPHEN 325 MG/1
650 TABLET ORAL EVERY 4 HOURS PRN
Status: DISCONTINUED | OUTPATIENT
Start: 2018-01-01 | End: 2018-01-01

## 2018-01-01 RX ORDER — SODIUM CHLORIDE 0.9 % (FLUSH) 0.9 %
10 SYRINGE (ML) INJECTION EVERY 12 HOURS SCHEDULED
Status: CANCELLED | OUTPATIENT
Start: 2018-01-01

## 2018-01-01 RX ORDER — ASPIRIN 300 MG/1
300 SUPPOSITORY RECTAL DAILY
Status: DISCONTINUED | OUTPATIENT
Start: 2018-01-01 | End: 2018-01-01 | Stop reason: SDUPTHER

## 2018-01-01 RX ORDER — HYDRALAZINE HYDROCHLORIDE 20 MG/ML
5 INJECTION INTRAMUSCULAR; INTRAVENOUS EVERY 4 HOURS PRN
Status: DISCONTINUED | OUTPATIENT
Start: 2018-01-01 | End: 2018-01-01 | Stop reason: HOSPADM

## 2018-01-01 RX ORDER — ACETAMINOPHEN 325 MG/1
650 TABLET ORAL EVERY 4 HOURS PRN
Status: DISCONTINUED | OUTPATIENT
Start: 2018-01-01 | End: 2018-01-01 | Stop reason: HOSPADM

## 2018-01-01 RX ORDER — MORPHINE SULFATE 4 MG/ML
3 INJECTION, SOLUTION INTRAMUSCULAR; INTRAVENOUS
Status: DISCONTINUED | OUTPATIENT
Start: 2018-01-01 | End: 2018-01-01 | Stop reason: HOSPADM

## 2018-01-01 RX ORDER — LISINOPRIL 30 MG/1
30 TABLET ORAL DAILY
Qty: 90 TABLET | Refills: 5 | Status: ON HOLD | OUTPATIENT
Start: 2018-01-01 | End: 2018-01-01

## 2018-01-01 RX ORDER — SENNA PLUS 8.6 MG/1
1 TABLET ORAL 2 TIMES DAILY
Status: DISCONTINUED | OUTPATIENT
Start: 2018-01-01 | End: 2018-01-01

## 2018-01-01 RX ORDER — MORPHINE SULFATE/0.9% NACL/PF 1 MG/ML
SYRINGE (ML) INJECTION CONTINUOUS
Status: CANCELLED | OUTPATIENT
Start: 2018-01-01

## 2018-01-01 RX ORDER — PANTOPRAZOLE SODIUM 40 MG/1
40 TABLET, DELAYED RELEASE ORAL
Status: DISCONTINUED | OUTPATIENT
Start: 2018-01-01 | End: 2018-01-01 | Stop reason: HOSPADM

## 2018-01-01 RX ORDER — PROPOFOL 10 MG/ML
10 INJECTION, EMULSION INTRAVENOUS
Status: DISCONTINUED | OUTPATIENT
Start: 2018-01-01 | End: 2018-01-01

## 2018-01-01 RX ORDER — CLOPIDOGREL BISULFATE 75 MG/1
75 TABLET ORAL DAILY
Status: DISCONTINUED | OUTPATIENT
Start: 2018-01-01 | End: 2018-01-01

## 2018-01-01 RX ORDER — ROCURONIUM BROMIDE 10 MG/ML
INJECTION, SOLUTION INTRAVENOUS PRN
Status: DISCONTINUED | OUTPATIENT
Start: 2018-01-01 | End: 2018-01-01 | Stop reason: HOSPADM

## 2018-01-01 RX ORDER — SODIUM CHLORIDE 0.9 % (FLUSH) 0.9 %
10 SYRINGE (ML) INJECTION EVERY 12 HOURS SCHEDULED
Status: DISCONTINUED | OUTPATIENT
Start: 2018-01-01 | End: 2018-01-01 | Stop reason: HOSPADM

## 2018-01-01 RX ORDER — MORPHINE SULFATE 2 MG/ML
2 INJECTION, SOLUTION INTRAMUSCULAR; INTRAVENOUS
Status: DISCONTINUED | OUTPATIENT
Start: 2018-01-01 | End: 2018-01-01 | Stop reason: HOSPADM

## 2018-01-01 RX ORDER — CETIRIZINE HYDROCHLORIDE 5 MG/1
5 TABLET ORAL DAILY
Status: DISCONTINUED | OUTPATIENT
Start: 2018-01-01 | End: 2018-01-01 | Stop reason: HOSPADM

## 2018-01-01 RX ORDER — GLYCOPYRROLATE 0.2 MG/ML
0.2 INJECTION INTRAMUSCULAR; INTRAVENOUS EVERY 4 HOURS PRN
Status: DISCONTINUED | OUTPATIENT
Start: 2018-01-01 | End: 2018-01-01 | Stop reason: HOSPADM

## 2018-01-01 RX ORDER — LANOLIN ALCOHOL/MO/W.PET/CERES
325 CREAM (GRAM) TOPICAL 2 TIMES DAILY
Qty: 60 TABLET | Refills: 11 | Status: ON HOLD | OUTPATIENT
Start: 2018-01-01 | End: 2018-01-01

## 2018-01-01 RX ORDER — LORATADINE 10 MG/1
10 TABLET ORAL DAILY
Status: DISCONTINUED | OUTPATIENT
Start: 2018-01-01 | End: 2018-01-01 | Stop reason: CLARIF

## 2018-01-01 RX ORDER — LEVOTHYROXINE SODIUM 0.1 MG/1
100 TABLET ORAL DAILY
Status: DISCONTINUED | OUTPATIENT
Start: 2018-01-01 | End: 2018-01-01 | Stop reason: HOSPADM

## 2018-01-01 RX ORDER — MORPHINE SULFATE 2 MG/ML
2 INJECTION, SOLUTION INTRAMUSCULAR; INTRAVENOUS
Status: DISCONTINUED | OUTPATIENT
Start: 2018-01-01 | End: 2018-01-01

## 2018-01-01 RX ORDER — QUETIAPINE FUMARATE 100 MG/1
100 TABLET, FILM COATED ORAL NIGHTLY
Status: DISCONTINUED | OUTPATIENT
Start: 2018-01-01 | End: 2018-01-01

## 2018-01-01 RX ORDER — FERROUS SULFATE 325(65) MG
325 TABLET ORAL 2 TIMES DAILY
Status: DISCONTINUED | OUTPATIENT
Start: 2018-01-01 | End: 2018-01-01

## 2018-01-01 RX ORDER — SODIUM CHLORIDE 9 MG/ML
INJECTION, SOLUTION INTRAVENOUS CONTINUOUS
Status: DISCONTINUED | OUTPATIENT
Start: 2018-01-01 | End: 2018-01-01

## 2018-01-01 RX ORDER — METOPROLOL SUCCINATE 50 MG/1
50 TABLET, EXTENDED RELEASE ORAL DAILY
Status: DISCONTINUED | OUTPATIENT
Start: 2018-01-01 | End: 2018-01-01 | Stop reason: HOSPADM

## 2018-01-01 RX ORDER — PROPOFOL 10 MG/ML
10 INJECTION, EMULSION INTRAVENOUS
Status: DISCONTINUED | OUTPATIENT
Start: 2018-01-01 | End: 2018-01-01 | Stop reason: SDUPTHER

## 2018-01-01 RX ORDER — LORAZEPAM 2 MG/ML
0.5 INJECTION INTRAMUSCULAR
Status: CANCELLED | OUTPATIENT
Start: 2018-01-01

## 2018-01-01 RX ORDER — HYDRALAZINE HYDROCHLORIDE 20 MG/ML
10 INJECTION INTRAMUSCULAR; INTRAVENOUS EVERY 6 HOURS PRN
Status: DISCONTINUED | OUTPATIENT
Start: 2018-01-01 | End: 2018-01-01

## 2018-01-01 RX ORDER — MORPHINE SULFATE 2 MG/ML
1 INJECTION, SOLUTION INTRAMUSCULAR; INTRAVENOUS
Status: DISCONTINUED | OUTPATIENT
Start: 2018-01-01 | End: 2018-01-01

## 2018-01-01 RX ORDER — ASPIRIN 81 MG/1
81 TABLET ORAL DAILY
Qty: 30 TABLET | Refills: 3 | Status: ON HOLD | OUTPATIENT
Start: 2018-01-01 | End: 2018-01-01

## 2018-01-01 RX ORDER — ATROPINE SULFATE 0.1 MG/ML
INJECTION INTRAVENOUS
Status: DISCONTINUED
Start: 2018-01-01 | End: 2018-01-01 | Stop reason: WASHOUT

## 2018-01-01 RX ORDER — DONEPEZIL HYDROCHLORIDE 10 MG/1
10 TABLET, FILM COATED ORAL NIGHTLY
Status: DISCONTINUED | OUTPATIENT
Start: 2018-01-01 | End: 2018-01-01 | Stop reason: HOSPADM

## 2018-01-01 RX ORDER — LIDOCAINE HYDROCHLORIDE 20 MG/ML
INJECTION, SOLUTION INFILTRATION; PERINEURAL PRN
Status: DISCONTINUED | OUTPATIENT
Start: 2018-01-01 | End: 2018-01-01 | Stop reason: HOSPADM

## 2018-01-01 RX ORDER — 0.9 % SODIUM CHLORIDE 0.9 %
500 INTRAVENOUS SOLUTION INTRAVENOUS ONCE
Status: COMPLETED | OUTPATIENT
Start: 2018-01-01 | End: 2018-01-01

## 2018-01-01 RX ORDER — LEVOTHYROXINE SODIUM 0.1 MG/1
TABLET ORAL
Qty: 90 TABLET | Refills: 0 | OUTPATIENT
Start: 2018-01-01

## 2018-01-01 RX ORDER — MORPHINE SULFATE 2 MG/ML
1 INJECTION, SOLUTION INTRAMUSCULAR; INTRAVENOUS
Status: DISCONTINUED | OUTPATIENT
Start: 2018-01-01 | End: 2018-01-01 | Stop reason: HOSPADM

## 2018-01-01 RX ORDER — QUETIAPINE FUMARATE 100 MG/1
100 TABLET, FILM COATED ORAL NIGHTLY
Status: DISCONTINUED | OUTPATIENT
Start: 2018-01-01 | End: 2018-01-01 | Stop reason: HOSPADM

## 2018-01-01 RX ORDER — SUCCINYLCHOLINE CHLORIDE 20 MG/ML
INJECTION INTRAMUSCULAR; INTRAVENOUS PRN
Status: DISCONTINUED | OUTPATIENT
Start: 2018-01-01 | End: 2018-01-01 | Stop reason: HOSPADM

## 2018-01-01 RX ORDER — DEXTROSE MONOHYDRATE 25 G/50ML
12.5 INJECTION, SOLUTION INTRAVENOUS PRN
Status: DISCONTINUED | OUTPATIENT
Start: 2018-01-01 | End: 2018-01-01

## 2018-01-01 RX ORDER — MORPHINE SULFATE 4 MG/ML
4 INJECTION, SOLUTION INTRAMUSCULAR; INTRAVENOUS
Status: DISCONTINUED | OUTPATIENT
Start: 2018-01-01 | End: 2018-01-01

## 2018-01-01 RX ORDER — IPRATROPIUM BROMIDE AND ALBUTEROL SULFATE 2.5; .5 MG/3ML; MG/3ML
1 SOLUTION RESPIRATORY (INHALATION) EVERY 4 HOURS PRN
Status: DISCONTINUED | OUTPATIENT
Start: 2018-01-01 | End: 2018-01-01 | Stop reason: HOSPADM

## 2018-01-01 RX ORDER — CLOPIDOGREL BISULFATE 75 MG/1
75 TABLET ORAL DAILY
Status: ON HOLD | COMMUNITY
End: 2018-01-01

## 2018-01-01 RX ORDER — LORAZEPAM 2 MG/ML
1 INJECTION INTRAMUSCULAR
Status: CANCELLED | OUTPATIENT
Start: 2018-01-01

## 2018-01-01 RX ORDER — SODIUM CHLORIDE 9 MG/ML
INJECTION, SOLUTION INTRAVENOUS CONTINUOUS
Status: DISCONTINUED | OUTPATIENT
Start: 2018-01-01 | End: 2018-01-01 | Stop reason: HOSPADM

## 2018-01-01 RX ORDER — IPRATROPIUM BROMIDE AND ALBUTEROL SULFATE 2.5; .5 MG/3ML; MG/3ML
1 SOLUTION RESPIRATORY (INHALATION) EVERY 4 HOURS PRN
Status: CANCELLED | OUTPATIENT
Start: 2018-01-01

## 2018-01-01 RX ORDER — ACETAMINOPHEN 650 MG/1
SUPPOSITORY RECTAL
Status: DISPENSED
Start: 2018-01-01 | End: 2018-01-01

## 2018-01-01 RX ORDER — NITROGLYCERIN 0.4 MG/1
0.4 TABLET SUBLINGUAL EVERY 5 MIN PRN
Status: DISCONTINUED | OUTPATIENT
Start: 2018-01-01 | End: 2018-01-01 | Stop reason: HOSPADM

## 2018-01-01 RX ORDER — LORAZEPAM 2 MG/ML
1 INJECTION INTRAMUSCULAR
Status: DISCONTINUED | OUTPATIENT
Start: 2018-01-01 | End: 2018-01-01 | Stop reason: HOSPADM

## 2018-01-01 RX ORDER — FENTANYL CITRATE 50 UG/ML
25 INJECTION, SOLUTION INTRAMUSCULAR; INTRAVENOUS EVERY 30 MIN PRN
Status: DISCONTINUED | OUTPATIENT
Start: 2018-01-01 | End: 2018-01-01

## 2018-01-01 RX ORDER — SODIUM CHLORIDE 9 MG/ML
INJECTION, SOLUTION INTRAVENOUS CONTINUOUS
Status: CANCELLED | OUTPATIENT
Start: 2018-01-01

## 2018-01-01 RX ORDER — HYDROCODONE BITARTRATE AND ACETAMINOPHEN 5; 325 MG/1; MG/1
1 TABLET ORAL EVERY 4 HOURS PRN
Status: DISCONTINUED | OUTPATIENT
Start: 2018-01-01 | End: 2018-01-01

## 2018-01-01 RX ORDER — LEVOFLOXACIN 5 MG/ML
500 INJECTION, SOLUTION INTRAVENOUS EVERY 24 HOURS
Status: DISCONTINUED | OUTPATIENT
Start: 2018-01-01 | End: 2018-01-01 | Stop reason: ALTCHOICE

## 2018-01-01 RX ORDER — NITROGLYCERIN 400 UG/1
1 SPRAY ORAL EVERY 5 MIN PRN
Status: DISCONTINUED | OUTPATIENT
Start: 2018-01-01 | End: 2018-01-01 | Stop reason: CLARIF

## 2018-01-01 RX ORDER — LEVOFLOXACIN 5 MG/ML
500 INJECTION, SOLUTION INTRAVENOUS ONCE
Status: COMPLETED | OUTPATIENT
Start: 2018-01-01 | End: 2018-01-01

## 2018-01-01 RX ORDER — MIDAZOLAM HYDROCHLORIDE 1 MG/ML
INJECTION INTRAMUSCULAR; INTRAVENOUS PRN
Status: DISCONTINUED | OUTPATIENT
Start: 2018-01-01 | End: 2018-01-01 | Stop reason: HOSPADM

## 2018-01-01 RX ORDER — NICOTINE POLACRILEX 4 MG
15 LOZENGE BUCCAL PRN
Status: DISCONTINUED | OUTPATIENT
Start: 2018-01-01 | End: 2018-01-01

## 2018-01-01 RX ORDER — VENLAFAXINE HYDROCHLORIDE 150 MG/1
150 CAPSULE, EXTENDED RELEASE ORAL
Status: DISCONTINUED | OUTPATIENT
Start: 2018-01-01 | End: 2018-01-01 | Stop reason: HOSPADM

## 2018-01-01 RX ORDER — MORPHINE SULFATE 4 MG/ML
4 INJECTION, SOLUTION INTRAMUSCULAR; INTRAVENOUS
Status: DISCONTINUED | OUTPATIENT
Start: 2018-01-01 | End: 2018-01-01 | Stop reason: HOSPADM

## 2018-01-01 RX ORDER — PANTOPRAZOLE SODIUM 40 MG/10ML
40 INJECTION, POWDER, LYOPHILIZED, FOR SOLUTION INTRAVENOUS DAILY
Status: DISCONTINUED | OUTPATIENT
Start: 2018-01-01 | End: 2018-01-01

## 2018-01-01 RX ORDER — DEXTROSE MONOHYDRATE 50 MG/ML
100 INJECTION, SOLUTION INTRAVENOUS PRN
Status: DISCONTINUED | OUTPATIENT
Start: 2018-01-01 | End: 2018-01-01

## 2018-01-01 RX ORDER — SODIUM CHLORIDE 0.9 % (FLUSH) 0.9 %
10 SYRINGE (ML) INJECTION PRN
Status: CANCELLED | OUTPATIENT
Start: 2018-01-01

## 2018-01-01 RX ADMIN — HYDRALAZINE HYDROCHLORIDE 10 MG: 20 INJECTION INTRAMUSCULAR; INTRAVENOUS at 08:32

## 2018-01-01 RX ADMIN — CEFTRIAXONE SODIUM 1 G: 10 INJECTION, POWDER, FOR SOLUTION INTRAVENOUS at 09:20

## 2018-01-01 RX ADMIN — VENLAFAXINE HYDROCHLORIDE 150 MG: 150 CAPSULE, EXTENDED RELEASE ORAL at 08:36

## 2018-01-01 RX ADMIN — PROPOFOL 10 MCG/KG/MIN: 10 INJECTION, EMULSION INTRAVENOUS at 22:00

## 2018-01-01 RX ADMIN — SODIUM CHLORIDE: 9 INJECTION, SOLUTION INTRAVENOUS at 11:20

## 2018-01-01 RX ADMIN — HYDRALAZINE HYDROCHLORIDE 5 MG: 20 INJECTION INTRAMUSCULAR; INTRAVENOUS at 19:43

## 2018-01-01 RX ADMIN — METOPROLOL SUCCINATE 50 MG: 50 TABLET, FILM COATED, EXTENDED RELEASE ORAL at 08:54

## 2018-01-01 RX ADMIN — LEVOFLOXACIN 500 MG: 5 INJECTION, SOLUTION INTRAVENOUS at 02:05

## 2018-01-01 RX ADMIN — FENTANYL CITRATE 25 MCG: 50 INJECTION, SOLUTION INTRAMUSCULAR; INTRAVENOUS at 04:10

## 2018-01-01 RX ADMIN — MORPHINE SULFATE 2 MG: 2 INJECTION, SOLUTION INTRAMUSCULAR; INTRAVENOUS at 11:21

## 2018-01-01 RX ADMIN — LIDOCAINE HYDROCHLORIDE 100 MG: 20 INJECTION, SOLUTION INFILTRATION; PERINEURAL at 19:15

## 2018-01-01 RX ADMIN — Medication 10 ML: at 09:31

## 2018-01-01 RX ADMIN — SODIUM CHLORIDE 250 ML: 9 INJECTION, SOLUTION INTRAVENOUS at 01:49

## 2018-01-01 RX ADMIN — MORPHINE SULFATE 30 MG: 1 INJECTION INTRAVENOUS at 18:23

## 2018-01-01 RX ADMIN — CETIRIZINE HYDROCHLORIDE 5 MG: 5 TABLET, FILM COATED ORAL at 08:36

## 2018-01-01 RX ADMIN — MORPHINE SULFATE 3 MG: 4 INJECTION INTRAVENOUS at 01:39

## 2018-01-01 RX ADMIN — FENTANYL CITRATE 25 MCG: 50 INJECTION, SOLUTION INTRAMUSCULAR; INTRAVENOUS at 01:05

## 2018-01-01 RX ADMIN — GLYCOPYRROLATE 0.2 MG: 0.2 INJECTION, SOLUTION INTRAMUSCULAR; INTRAVENOUS at 01:42

## 2018-01-01 RX ADMIN — LEVOTHYROXINE SODIUM 100 MCG: 100 TABLET ORAL at 04:12

## 2018-01-01 RX ADMIN — MORPHINE SULFATE 0.5 MG: 2 INJECTION, SOLUTION INTRAMUSCULAR; INTRAVENOUS at 13:42

## 2018-01-01 RX ADMIN — QUETIAPINE FUMARATE 100 MG: 100 TABLET ORAL at 23:36

## 2018-01-01 RX ADMIN — PANTOPRAZOLE SODIUM 40 MG: 40 INJECTION, POWDER, FOR SOLUTION INTRAVENOUS at 08:36

## 2018-01-01 RX ADMIN — ATORVASTATIN CALCIUM 10 MG: 10 TABLET, FILM COATED ORAL at 22:01

## 2018-01-01 RX ADMIN — SUCCINYLCHOLINE CHLORIDE 100 MG: 20 INJECTION, SOLUTION INTRAMUSCULAR; INTRAVENOUS at 19:15

## 2018-01-01 RX ADMIN — HYDRALAZINE HYDROCHLORIDE 10 MG: 20 INJECTION INTRAMUSCULAR; INTRAVENOUS at 01:05

## 2018-01-01 RX ADMIN — CEFTRIAXONE 1 G: 1 INJECTION, POWDER, FOR SOLUTION INTRAMUSCULAR; INTRAVENOUS at 21:02

## 2018-01-01 RX ADMIN — IPRATROPIUM BROMIDE AND ALBUTEROL SULFATE 1 AMPULE: .5; 3 SOLUTION RESPIRATORY (INHALATION) at 16:25

## 2018-01-01 RX ADMIN — Medication 32 MILLICURIE: at 10:25

## 2018-01-01 RX ADMIN — SODIUM CHLORIDE: 9 INJECTION, SOLUTION INTRAVENOUS at 22:03

## 2018-01-01 RX ADMIN — SODIUM CHLORIDE: 9 INJECTION, SOLUTION INTRAVENOUS at 01:50

## 2018-01-01 RX ADMIN — PROPOFOL 70 MG: 10 INJECTION, EMULSION INTRAVENOUS at 13:14

## 2018-01-01 RX ADMIN — DEXMEDETOMIDINE HYDROCHLORIDE 0.4 MCG/KG/HR: 100 INJECTION, SOLUTION INTRAVENOUS at 22:00

## 2018-01-01 RX ADMIN — PANTOPRAZOLE SODIUM 40 MG: 40 INJECTION, POWDER, FOR SOLUTION INTRAVENOUS at 02:06

## 2018-01-01 RX ADMIN — FENTANYL CITRATE 25 MCG: 50 INJECTION, SOLUTION INTRAMUSCULAR; INTRAVENOUS at 06:05

## 2018-01-01 RX ADMIN — FENTANYL CITRATE 25 MCG: 50 INJECTION, SOLUTION INTRAMUSCULAR; INTRAVENOUS at 08:45

## 2018-01-01 RX ADMIN — ATORVASTATIN CALCIUM 10 MG: 10 TABLET, FILM COATED ORAL at 22:03

## 2018-01-01 RX ADMIN — DONEPEZIL HYDROCHLORIDE 10 MG: 10 TABLET, FILM COATED ORAL at 02:06

## 2018-01-01 RX ADMIN — PANTOPRAZOLE SODIUM 40 MG: 40 TABLET, DELAYED RELEASE ORAL at 05:23

## 2018-01-01 RX ADMIN — SODIUM CHLORIDE 500 ML: 9 INJECTION, SOLUTION INTRAVENOUS at 19:53

## 2018-01-01 RX ADMIN — LEVOTHYROXINE SODIUM 100 MCG: 100 TABLET ORAL at 05:00

## 2018-01-01 RX ADMIN — HYDRALAZINE HYDROCHLORIDE 5 MG: 20 INJECTION INTRAMUSCULAR; INTRAVENOUS at 14:17

## 2018-01-01 RX ADMIN — MORPHINE SULFATE 2 MG: 2 INJECTION, SOLUTION INTRAMUSCULAR; INTRAVENOUS at 11:41

## 2018-01-01 RX ADMIN — MORPHINE SULFATE 2 MG: 4 INJECTION INTRAVENOUS at 12:29

## 2018-01-01 RX ADMIN — MORPHINE SULFATE 2 MG: 2 INJECTION, SOLUTION INTRAMUSCULAR; INTRAVENOUS at 16:10

## 2018-01-01 RX ADMIN — Medication 10 ML: at 21:00

## 2018-01-01 RX ADMIN — ISOSORBIDE MONONITRATE 30 MG: 30 TABLET ORAL at 09:31

## 2018-01-01 RX ADMIN — IPRATROPIUM BROMIDE AND ALBUTEROL SULFATE 1 AMPULE: .5; 3 SOLUTION RESPIRATORY (INHALATION) at 08:55

## 2018-01-01 RX ADMIN — MORPHINE SULFATE 2 MG: 2 INJECTION, SOLUTION INTRAMUSCULAR; INTRAVENOUS at 08:48

## 2018-01-01 RX ADMIN — QUETIAPINE FUMARATE 100 MG: 100 TABLET ORAL at 02:06

## 2018-01-01 RX ADMIN — Medication 9.3 MILLICURIE: at 09:30

## 2018-01-01 RX ADMIN — ATORVASTATIN CALCIUM 10 MG: 10 TABLET, FILM COATED ORAL at 02:06

## 2018-01-01 RX ADMIN — METOPROLOL SUCCINATE 50 MG: 50 TABLET, FILM COATED, EXTENDED RELEASE ORAL at 09:32

## 2018-01-01 RX ADMIN — CEFTRIAXONE SODIUM 1 G: 10 INJECTION, POWDER, FOR SOLUTION INTRAVENOUS at 09:29

## 2018-01-01 RX ADMIN — MORPHINE SULFATE 1 MG: 2 INJECTION, SOLUTION INTRAMUSCULAR; INTRAVENOUS at 14:44

## 2018-01-01 RX ADMIN — IPRATROPIUM BROMIDE AND ALBUTEROL SULFATE 1 AMPULE: .5; 3 SOLUTION RESPIRATORY (INHALATION) at 12:38

## 2018-01-01 RX ADMIN — LISINOPRIL 20 MG: 20 TABLET ORAL at 08:36

## 2018-01-01 RX ADMIN — LISINOPRIL 20 MG: 20 TABLET ORAL at 08:54

## 2018-01-01 RX ADMIN — LEVOTHYROXINE SODIUM 100 MCG: 100 TABLET ORAL at 05:22

## 2018-01-01 RX ADMIN — Medication 10 ML: at 10:15

## 2018-01-01 RX ADMIN — DONEPEZIL HYDROCHLORIDE 10 MG: 10 TABLET, FILM COATED ORAL at 22:01

## 2018-01-01 RX ADMIN — LISINOPRIL 20 MG: 20 TABLET ORAL at 09:31

## 2018-01-01 RX ADMIN — PANTOPRAZOLE SODIUM 40 MG: 40 INJECTION, POWDER, FOR SOLUTION INTRAVENOUS at 01:43

## 2018-01-01 RX ADMIN — GLYCOPYRROLATE 0.2 MG: 0.2 INJECTION, SOLUTION INTRAMUSCULAR; INTRAVENOUS at 11:43

## 2018-01-01 RX ADMIN — CEFTRIAXONE SODIUM 1 G: 10 INJECTION, POWDER, FOR SOLUTION INTRAVENOUS at 11:24

## 2018-01-01 RX ADMIN — CETIRIZINE HYDROCHLORIDE 5 MG: 5 TABLET, FILM COATED ORAL at 09:32

## 2018-01-01 RX ADMIN — LORAZEPAM 1 MG: 2 INJECTION INTRAMUSCULAR; INTRAVENOUS at 11:41

## 2018-01-01 RX ADMIN — SODIUM CHLORIDE: 9 INJECTION, SOLUTION INTRAVENOUS at 06:27

## 2018-01-01 RX ADMIN — PANTOPRAZOLE SODIUM 40 MG: 40 INJECTION, POWDER, FOR SOLUTION INTRAVENOUS at 10:28

## 2018-01-01 RX ADMIN — CETIRIZINE HYDROCHLORIDE 5 MG: 5 TABLET, FILM COATED ORAL at 08:54

## 2018-01-01 RX ADMIN — Medication 10 ML: at 22:01

## 2018-01-01 RX ADMIN — METOPROLOL SUCCINATE 50 MG: 50 TABLET, FILM COATED, EXTENDED RELEASE ORAL at 08:36

## 2018-01-01 RX ADMIN — LIDOCAINE HYDROCHLORIDE 100 MG: 20 INJECTION, SOLUTION INFILTRATION; PERINEURAL at 13:14

## 2018-01-01 RX ADMIN — SODIUM CHLORIDE: 9 INJECTION, SOLUTION INTRAVENOUS at 18:23

## 2018-01-01 RX ADMIN — ISOSORBIDE MONONITRATE 30 MG: 30 TABLET ORAL at 08:54

## 2018-01-01 RX ADMIN — IPRATROPIUM BROMIDE AND ALBUTEROL SULFATE 1 AMPULE: .5; 3 SOLUTION RESPIRATORY (INHALATION) at 08:08

## 2018-01-01 RX ADMIN — QUETIAPINE FUMARATE 100 MG: 100 TABLET ORAL at 22:01

## 2018-01-01 RX ADMIN — MORPHINE SULFATE 3 MG: 4 INJECTION INTRAVENOUS at 18:47

## 2018-01-01 RX ADMIN — VENLAFAXINE HYDROCHLORIDE 150 MG: 150 CAPSULE, EXTENDED RELEASE ORAL at 09:31

## 2018-01-01 RX ADMIN — Medication 10 ML: at 08:54

## 2018-01-01 RX ADMIN — MORPHINE SULFATE 2 MG: 2 INJECTION, SOLUTION INTRAMUSCULAR; INTRAVENOUS at 14:03

## 2018-01-01 RX ADMIN — SODIUM CHLORIDE: 9 INJECTION, SOLUTION INTRAVENOUS at 22:00

## 2018-01-01 RX ADMIN — ISOSORBIDE MONONITRATE 30 MG: 30 TABLET ORAL at 08:36

## 2018-01-01 RX ADMIN — MIDAZOLAM HYDROCHLORIDE 2 MG: 1 INJECTION, SOLUTION INTRAMUSCULAR; INTRAVENOUS at 19:15

## 2018-01-01 RX ADMIN — MORPHINE SULFATE 0.5 MG: 2 INJECTION, SOLUTION INTRAMUSCULAR; INTRAVENOUS at 12:54

## 2018-01-01 RX ADMIN — PANTOPRAZOLE SODIUM 40 MG: 40 INJECTION, POWDER, FOR SOLUTION INTRAVENOUS at 17:18

## 2018-01-01 RX ADMIN — FENTANYL CITRATE 25 MCG: 50 INJECTION, SOLUTION INTRAMUSCULAR; INTRAVENOUS at 07:12

## 2018-01-01 RX ADMIN — FENTANYL CITRATE 25 MCG: 50 INJECTION, SOLUTION INTRAMUSCULAR; INTRAVENOUS at 02:43

## 2018-01-01 RX ADMIN — Medication 50 MG: at 19:20

## 2018-01-01 RX ADMIN — VENLAFAXINE HYDROCHLORIDE 150 MG: 150 CAPSULE, EXTENDED RELEASE ORAL at 08:53

## 2018-01-01 RX ADMIN — PROPOFOL 50 MG: 10 INJECTION, EMULSION INTRAVENOUS at 19:15

## 2018-01-01 RX ADMIN — PANTOPRAZOLE SODIUM 40 MG: 40 INJECTION, POWDER, FOR SOLUTION INTRAVENOUS at 09:31

## 2018-01-01 RX ADMIN — IPRATROPIUM BROMIDE AND ALBUTEROL SULFATE 1 AMPULE: .5; 3 SOLUTION RESPIRATORY (INHALATION) at 21:30

## 2018-01-01 ASSESSMENT — PAIN SCALES - GENERAL
PAINLEVEL_OUTOF10: 0
PAINLEVEL_OUTOF10: 4
PAINLEVEL_OUTOF10: 0
PAINLEVEL_OUTOF10: 4
PAINLEVEL_OUTOF10: 0
PAINLEVEL_OUTOF10: 0
PAINLEVEL_OUTOF10: 4
PAINLEVEL_OUTOF10: 0
PAINLEVEL_OUTOF10: 6
PAINLEVEL_OUTOF10: 0
PAINLEVEL_OUTOF10: 6
PAINLEVEL_OUTOF10: 4
PAINLEVEL_OUTOF10: 6
PAINLEVEL_OUTOF10: 0
PAINLEVEL_OUTOF10: 3

## 2018-01-01 ASSESSMENT — PULMONARY FUNCTION TESTS
PIF_VALUE: 23
PIF_VALUE: 25
PIF_VALUE: 20
PIF_VALUE: 26
PIF_VALUE: 22
PIF_VALUE: 15
PIF_VALUE: 21
PIF_VALUE: 22
PIF_VALUE: 22
PIF_VALUE: 24
PIF_VALUE: 20

## 2018-01-01 ASSESSMENT — ENCOUNTER SYMPTOMS
ABDOMINAL PAIN: 0
EYE PAIN: 0
FACIAL SWELLING: 0
COUGH: 0
BACK PAIN: 0
SHORTNESS OF BREATH: 0
COLOR CHANGE: 0
NAUSEA: 0
DIARRHEA: 0
VOMITING: 0
EYE REDNESS: 0
VOICE CHANGE: 0
RHINORRHEA: 0
SORE THROAT: 0
CONSTIPATION: 0
CHEST TIGHTNESS: 0
PHOTOPHOBIA: 0
WHEEZING: 0
STRIDOR: 0
EYE DISCHARGE: 0
ABDOMINAL DISTENTION: 0

## 2018-01-01 ASSESSMENT — PATIENT HEALTH QUESTIONNAIRE - PHQ9
SUM OF ALL RESPONSES TO PHQ9 QUESTIONS 1 & 2: 0
1. LITTLE INTEREST OR PLEASURE IN DOING THINGS: 0
2. FEELING DOWN, DEPRESSED OR HOPELESS: 0
SUM OF ALL RESPONSES TO PHQ QUESTIONS 1-9: 0

## 2018-01-01 ASSESSMENT — PAIN - FUNCTIONAL ASSESSMENT: PAIN_FUNCTIONAL_ASSESSMENT: 0-10

## 2018-01-01 ASSESSMENT — COPD QUESTIONNAIRES: CAT_SEVERITY: MILD

## 2018-01-09 PROBLEM — K92.2 GI BLEED: Status: ACTIVE | Noted: 2018-01-01

## 2018-01-09 PROBLEM — R41.82 ALTERED MENTAL STATUS: Status: ACTIVE | Noted: 2018-01-01

## 2018-01-09 PROBLEM — N39.0 UTI (URINARY TRACT INFECTION): Status: ACTIVE | Noted: 2018-01-01

## 2018-01-09 PROBLEM — R53.1 WEAKNESS: Status: ACTIVE | Noted: 2018-01-01

## 2018-01-09 PROBLEM — J01.00 ACUTE MAXILLARY SINUSITIS: Status: ACTIVE | Noted: 2018-01-01

## 2018-01-09 PROBLEM — E83.51 HYPOCALCEMIA: Status: ACTIVE | Noted: 2018-01-01

## 2018-01-09 PROBLEM — R04.0 EPISTAXIS: Status: ACTIVE | Noted: 2018-01-01

## 2018-01-09 PROBLEM — R73.9 HYPERGLYCEMIA: Status: ACTIVE | Noted: 2018-01-01

## 2018-01-09 NOTE — TELEPHONE ENCOUNTER
Pt started with a bloody nose last night  - stopped at present but will start up again if she coughs - pt coughs from nasal drainage - diarrhea started Sat -     After discussion with Dr Jorge Fabián, pt is to do nothing with nose tonight - do not blow nose, ect - tomorrow, pt should gentle instill saline nasal drops - pt should push fluids, rest with head elevated to enhance drainage - give diarrhea a week to run it's course - if not getting better and pt feels lightheaded, advised to go to ER - Candido Fernando notified and understanding voiced

## 2018-01-10 PROBLEM — G93.41 ACUTE METABOLIC ENCEPHALOPATHY: Status: ACTIVE | Noted: 2018-01-01

## 2018-01-10 NOTE — CARE COORDINATION
1/10/18, 7:39 AM      [address]       Admitted from: ER 1/9/2018/ [address] day: 1   Location: -04/004-A Reason for admit: GI bleed [K92.2]  Urinary tract infection without hematuria, site unspecified [N39.0]  Gastrointestinal hemorrhage, unspecified gastrointestinal hemorrhage type [K92.2] Status: IP   Admit order signed?: yes  PMH:  has a past medical history of Anxiety; CAD (coronary artery disease); COPD (chronic obstructive pulmonary disease) ([facility] [address] Dementia; Hyperlipidemia; Hypertension; Hypothyroidism; TIA (transient ischemic attack); and Urinary incontinence. Medications:  Scheduled Meds:   pantoprazole  40 mg Intravenous Q8H    [START ON 1/11/2018] levofloxacin  250 mg Intravenous Daily    cetirizine  5 mg Oral Daily    atorvastatin  10 mg Oral Nightly    donepezil  10 mg Oral Nightly    isosorbide mononitrate  30 mg Oral Daily    levothyroxine  100 mcg Oral Daily    lisinopril  20 mg Oral Daily    metoprolol succinate  50 mg Oral Daily    QUEtiapine  100 mg Oral Nightly    venlafaxine  150 mg Oral Daily with breakfast    calcium replacement protocol   Other RX Placeholder    sodium chloride flush  10 mL Intravenous 2 times per day     Continuous Infusions:   sodium chloride 100 mL/hr at 01/10/18 0150      Pertinent Info/Orders/Treatment Plan:  Ical 1.08, Hgb 7.0, UA +, ocuult stool +. Received 2 units PRBC. IVF, IV ATB, IV protonix. GI consult pending. Echo ordered. Diet: DIET CARB CONTROL;   DVT Prophylaxis: SCD's ordered  Smoking status:  reports that she has quit smoking. Her smoking use included Cigarettes. She has a 80.00 pack-year smoking history.  She has never used smokeless tobacco.   Influenza Vaccination Screening Completed: yes  Pneumonia Vaccination Screening Completed: yes  Core measures: vte  PCP: Pete Mejia MD  Readmission:   no  Risk Score: 13.5     Discharge Planning  Current Residence:  Private Residence  Living Arrangements:  Alone   Support Systems:

## 2018-01-10 NOTE — FLOWSHEET NOTE
Updated Dr. Nichols Si on stool color. States he will be up to see patient later. HGB stable, 9.7 and trending every 6 hours. Fecal occult blood tested positive at [facility] prior to being admitted to [facility], which he is aware of. Planning for EGD tomorrow.

## 2018-01-10 NOTE — H&P
History & Physical    Patient: Bria Moreno  YOB: 1935  Date of Service: 1/9/2018  MRN: [id number]   Acct:  [de-identified]   Primary Care Physician: Jaskaran Martinez MD    Chief Complaint: Nose bleed, Dark stools, Anemia, Fatigue    History of Present Illness:   History obtained from chart review and the patient. The patient is a 80 y.o. female with CAD,  COPD,  Dementia; Hyperlipidemia; Hypertension; Hypothyroidism; TIA,  Urinary incontinence who presents from [facility] where she was evaluated for complaint of weakness. Patient's family are no longer at the bedside. Patient relates that she had nose bleed 3 days ago. She denies falls. Per ED report, nosebleeds had been intermittent for the past few days. Patient denies falls or trauma. Family also mentioned that patient had been more confused lately. Patient seemed to get more confused when she stands up but no history of falls or head trauma. The patient denies chest pain, shortness of breath,  abdominal pain, cough, fever, chills, nausea, vomiting, diarrhea or dysuria. Initial labs show H/H 7.1/21.5. Hemocult stool is positive for blood. Patient notes colonoscopy in the past 5 years but is unsure of dates. Patient does take aspirin and plavix and these are being withheld. Patient has been typed and crossmatched for PRBCs and will be receiving 2 units soon. GI has been consulted. Past Medical History:        Diagnosis Date    Anxiety     CAD (coronary artery disease)     Dr Wilfrido Cortes COPD (chronic obstructive pulmonary disease) ([facility] [address]     Dementia     Hyperlipidemia     Hypertension     Hypothyroidism     TIA (transient ischemic attack) 3/2013    Urinary incontinence        Past Surgical History:        Procedure Laterality Date    CARDIAC CATHETERIZATION      stent    COLONOSCOPY  5/2005    HYSTERECTOMY      SINUS SURGERY         Home Medications:   No current facility-administered medications on file prior to encounter.       Current Review of systems:  Constitutional: no fever, no night sweats, no fatigue  Head: no headache, no head injury, no migranes. Eye: no blurring of vision, no double vision. Ears: no hearing difficulty, no tinnitus  Mouth/throat: no ulceration, dental caries, dysphagia  Lungs: no cough, no shortness of breath, no wheeze  CVS: no palpitation, no chest pain, no shortness of breath  GI: no abdominal pain, no nausea , no vomiting, no constipation  PETRONA: no dysuria, frequency and urgency, no hematuria, no kidney stones  Musculoskeletal: no joint pain, swelling , stiffness  Endocrine: no polyuria, polydypsia, no cold or heat intolerence  Hematology: no anemia, no easy brusing or bleeding, no hx of clotting disorder  Dermatology: no skin rash, no eczema, no prurities,  Psychiatry: no depression, no anxiety,no panic attacks, no suicide ideation  Neurology: no syncope, no seizures, no numbness or tingling of hands, no numbness or tingling of feet, no paresis    10 point review of systems completed, all other than noted above are negative. Vitals:   Vitals:    01/09/18 2215   BP: 136/63   Pulse: 67   Resp: 18   Temp: 97.6 °F (36.4 °C)   SpO2: 100%      BMI: Body mass index is 23.51 kg/m².                 Exam:  Physical Examination: General appearance - alert, well appearing, and in no distress  Mental status - alert, oriented to person, place  Neck - supple, no significant adenopathy, no JVD, or carotid bruits  Chest - clear to auscultation, no wheezes, rales or rhonchi, symmetric air entry  Heart - normal rate, regular rhythm, normal S1, S2, no murmurs, rubs, clicks or gallops  Abdomen - soft, nontender, nondistended, no masses or organomegaly  Neurological - alert, oriented, normal speech, no focal findings or movement disorder noted  Musculoskeletal - no joint tenderness, deformity or swelling  Extremities - peripheral pulses normal, no pedal edema, no clubbing or cyanosis  Skin - normal coloration and turgor, no rashes, no suspicious skin lesions noted      Review of Labs and Diagnostic Testing:    Recent Results (from the past 24 hour(s))   CBC auto differential    Collection Time: 01/09/18  7:40 PM   Result Value Ref Range    WBC 3.9 (L) 4.8 - 10.8 thou/mm3    RBC 2.63 (L) 4.20 - 5.40 mill/mm3    Hemoglobin 7.1 (L) 12.0 - 16.0 gm/dl    Hematocrit 21.5 (L) 37.0 - 47.0 %    MCV 81.6 81.0 - 99.0 fL    MCH 26.8 (L) 27.0 - 31.0 pg    MCHC 32.9 (L) 33.0 - 37.0 gm/dl    RDW 14.7 (H) 11.5 - 14.5 %    Platelets 000 959 - 671 thou/mm3    MPV 8.1 7.4 - 10.4 mcm    RBC Morphology SL ABNORMAL     SEGS 42.3 (L) 43.0 - 75.0 %    Lymphocytes 41.4 15.0 - 47.0 %    Monocytes 12.0 0.0 - 12.0 %    Eosinophils % 3.9 0.0 - 4.0 %    Basophils 0.4 0.0 - 3.0 %    Platelet Estimate ADEQ.  Adequate    Anisocytosis 1+ Absent    Poikilocytes SLIGHT Absent    Hypochromia 1+ Absent   Comprehensive metabolic panel    Collection Time: 01/09/18  7:40 PM   Result Value Ref Range    Glucose 130 (H) 74 - 106 mg/dl    CREATININE 1.0 0.6 - 1.1 mg/dl    BUN 30 (H) 7 - 18 mg/dl    Sodium 141 136 - 145 meq/l    Potassium 3.9 3.5 - 5.1 meq/l    Chloride 107 98 - 107 meq/l    CO2 27 21 - 32 meq/l    POC CALCIUM 7.8 (L) 8.5 - 10.1 mg/dl    AST 21 15 - 37 U/L    Alkaline Phosphatase 56 46 - 116 U/L    Total Protein 5.3 (L) 6.4 - 8.2 gm/dl    Alb 2.5 (L) 3.5 - 5.0 gm/dl    Total Bilirubin 0.3 0.2 - 1.0 mg/dl    ALT 15 12 - 78 U/L   Troponin    Collection Time: 01/09/18  7:40 PM   Result Value Ref Range    Troponin I 0.05 ng/ml   Lactic Acid    Collection Time: 01/09/18  7:40 PM   Result Value Ref Range    Lactate 2.90 (H) 0.90 - 1.70 mmol/L   APTT    Collection Time: 01/09/18  7:40 PM   Result Value Ref Range    aPTT 24.0 22.0 - 38.0 seconds   Glomerular Filtration Rate, Estimated    Collection Time: 01/09/18  7:40 PM   Result Value Ref Range    GFR, Estimated 56 (A) ml/min/1.73m2   ANION GAP    Collection Time: 01/09/18  7:40 PM   Result Value Ref Range    Anion Gap 7.0 (L) 8.0 - 16.0 meq/l   Protime    Collection Time: 01/09/18  7:40 PM   Result Value Ref Range    INR 1.07 0.85 - 1.13   Scan of Blood Smear    Collection Time: 01/09/18  7:40 PM   Result Value Ref Range    SCAN OF BLOOD SMEAR see below    Blood occult stool screen #1    Collection Time: 01/09/18  8:00 PM   Result Value Ref Range    OCCULT BLOOD FECAL POSITIVE NEGATIVE   Urine reflex to culture    Collection Time: 01/09/18  8:35 PM   Result Value Ref Range    Glucose, Urine NEGATIVE NEGATIVE mg/dl    Bilirubin Urine NEGATIVE     Ketones, Urine NEGATIVE NEGATIVE    Specific Gravity, UA 1.020 1.002 - 1.03    Blood, Urine TRACE (A) NEGATIVE    pH, UA 6.5 5.0 - 9.0    Protein, UA TRACE (A) NEGATIVE mg/dl    Urobilinogen, Urine 0.2 0.0 - 1.0 eu/dl    Nitrite, Urine POSITIVE (A) NEGATIVE    Leukocyte Esterase, Urine MODERATE (A) NEGATIVE    Color, UA YELLOW STRAW-YELL    Character, Urine SL CLOUDY CLEAR-SL C    RBC, UA 0-2 0-2/hpf /hpf    WBC, UA >100W/CLUMPS 0-4/hpf /hpf    Epi Cells NONE 3-5/hpf /hpf    Amorphous, UA NONE SEEN none seen    Mucus, UA NONE SEEN none seen    Bacteria, UA MANY few/none s    Casts UA NONE SEEN none seen /lpf    Crystals NONE SEEN none seen    REFLEX TO URINE C & S INDICATED        Radiology:     Ct Abdomen Pelvis Wo Iv Contrast Additional Contrast? None    Result Date: 1/9/2018  PROCEDURE: CT ABDOMEN PELVIS WO IV CONTRAST CLINICAL INFORMATION: ABDOMINAL PAIN, rectal bleeding,confusion . COMPARISON: No prior study. TECHNIQUE: Noncontrast images of the abdomen and pelvis with multiplanar reconstructions. All CT scans at this facility use dose modulation, iterative reconstruction, and/or weight-based dosing when appropriate to reduce radiation dose to as low as reasonably achievable. FINDINGS: Lung bases Large hiatal hernia. No infiltrates or effusions.  Abdomen pelvis Solid or evaluation is limited without IV contrast also note that the entire liver volume is not included on the exam.

## 2018-01-10 NOTE — PROGRESS NOTES
Pharmacy Renal Adjustment Consult    Bria Moreno is a 80 y.o. female. Pharmacy has been consulted to renally adjust medications. Recent Labs      01/09/18 1940   BUN  30*       Recent Labs      01/09/18 1940   CREATININE  1.0       Estimated Creatinine Clearance: 39 mL/min (based on SCr of 1 mg/dL).     Height:   Ht Readings from Last 1 Encounters:   01/09/18 5' 5\" (1.651 m)     Weight:  Wt Readings from Last 1 Encounters:   01/09/18 141 lb 4.8 oz (64.1 kg)       CKD stage: 3       Baseline SCr: 1    Plan: Adjust the following medications based on renal function:         Decrease Levaquin to 500 mg IVx1 followed by 250 mg IV Q24H

## 2018-01-10 NOTE — DISCHARGE SUMMARY
discharge    Patient: Nellie Colmenares  YOB: 1935  Date of Service: 1/10/2018  MRN: [id number]   Acct:  [de-identified]   Primary Care Physician: Sue Herzog MD    Chief Complaint: Nose bleed, Dark stools, Anemia, Fatigue    History of Present Illness:   History obtained from chart review and the patient. The patient is a 80 y.o. female with CAD,  COPD,  Dementia; Hyperlipidemia; Hypertension; Hypothyroidism; TIA,  Urinary incontinence who presents from [facility] where she was evaluated for complaint of weakness. Patient's family are no longer at the bedside. Patient relates that she had nose bleed 3 days ago. She denies falls. Per ED report, nosebleeds had been intermittent for the past few days. Patient denies falls or trauma. Family also mentioned that patient had been more confused lately. Patient seemed to get more confused when she stands up but no history of falls or head trauma. The patient denies chest pain, shortness of breath,  abdominal pain, cough, fever, chills, nausea, vomiting, diarrhea or dysuria. Initial labs show H/H 7.1/21.5. Hemocult stool is positive for blood. Patient notes colonoscopy in the past 5 years but is unsure of dates. Patient does take aspirin and plavix and these are being withheld. Patient has been typed and crossmatched for PRBCs and will be receiving 2 units soon. GI has been consulted.       Subjective:-    Doing better  No confusion  Still with lower gi bleed    Scheduled Meds:   pantoprazole  40 mg Intravenous Q8H    cetirizine  5 mg Oral Daily    cefTRIAXone (ROCEPHIN) IV  1 g Intravenous Q24H    atorvastatin  10 mg Oral Nightly    donepezil  10 mg Oral Nightly    isosorbide mononitrate  30 mg Oral Daily    levothyroxine  100 mcg Oral Daily    lisinopril  20 mg Oral Daily    metoprolol succinate  50 mg Oral Daily    QUEtiapine  100 mg Oral Nightly    venlafaxine  150 mg Oral Daily with breakfast    calcium replacement protocol   Other RX Placeholder  sodium chloride flush  10 mL Intravenous 2 times per day     Continuous Infusions:   sodium chloride 100 mL/hr at 01/10/18 1120     PRN Meds:.nitroGLYCERIN, hydrALAZINE, sodium chloride, sodium chloride flush, acetaminophen, magnesium hydroxide, ondansetron         Vitals:   Vitals:    01/10/18 1718   BP: (!) 146/68   Pulse: 63   Resp: 18   Temp:    SpO2:       BMI: Body mass index is 23.51 kg/m².                 Exam:  Physical Examination: General appearance - alert, well appearing, and in no distress  Mental status - alert, oriented to person, place  Neck - supple, no significant adenopathy, no JVD, or carotid bruits  Chest - clear to auscultation, no wheezes, rales or rhonchi, symmetric air entry  Heart - normal rate, regular rhythm, normal S1, S2, no murmurs, rubs, clicks or gallops  Abdomen - soft, nontender, nondistended, no masses or organomegaly  Neurological - alert, oriented, normal speech, no focal findings or movement disorder noted  Musculoskeletal - no joint tenderness, deformity or swelling  Extremities - peripheral pulses normal, no pedal edema, no clubbing or cyanosis  Skin - normal coloration and turgor, no rashes, no suspicious skin lesions noted      Review of Labs and Diagnostic Testing:    Recent Results (from the past 24 hour(s))   EKG 12 Lead    Collection Time: 01/09/18  7:32 PM   Result Value Ref Range    Ventricular Rate 66 BPM    Atrial Rate 66 BPM    P-R Interval 160 ms    QRS Duration 90 ms    Q-T Interval 424 ms    QTc Calculation (Bazett) 444 ms    P Axis 33 degrees    R Axis 4 degrees    T Axis 53 degrees   CBC auto differential    Collection Time: 01/09/18  7:40 PM   Result Value Ref Range    WBC 3.9 (L) 4.8 - 10.8 thou/mm3    RBC 2.63 (L) 4.20 - 5.40 mill/mm3    Hemoglobin 7.1 (L) 12.0 - 16.0 gm/dl    Hematocrit 21.5 (L) 37.0 - 47.0 %    MCV 81.6 81.0 - 99.0 fL    MCH 26.8 (L) 27.0 - 31.0 pg    MCHC 32.9 (L) 33.0 - 37.0 gm/dl    RDW 14.7 (H) 11.5 - 14.5 %    Platelets 033 847 - 400 thou/mm3    MPV 8.1 7.4 - 10.4 mcm    RBC Morphology SL ABNORMAL     SEGS 42.3 (L) 43.0 - 75.0 %    Lymphocytes 41.4 15.0 - 47.0 %    Monocytes 12.0 0.0 - 12.0 %    Eosinophils % 3.9 0.0 - 4.0 %    Basophils 0.4 0.0 - 3.0 %    Platelet Estimate ADEQ.  Adequate    Anisocytosis 1+ Absent    Poikilocytes SLIGHT Absent    Hypochromia 1+ Absent   Comprehensive metabolic panel    Collection Time: 01/09/18  7:40 PM   Result Value Ref Range    Glucose 130 (H) 74 - 106 mg/dl    CREATININE 1.0 0.6 - 1.1 mg/dl    BUN 30 (H) 7 - 18 mg/dl    Sodium 141 136 - 145 meq/l    Potassium 3.9 3.5 - 5.1 meq/l    Chloride 107 98 - 107 meq/l    CO2 27 21 - 32 meq/l    POC CALCIUM 7.8 (L) 8.5 - 10.1 mg/dl    AST 21 15 - 37 U/L    Alkaline Phosphatase 56 46 - 116 U/L    Total Protein 5.3 (L) 6.4 - 8.2 gm/dl    Alb 2.5 (L) 3.5 - 5.0 gm/dl    Total Bilirubin 0.3 0.2 - 1.0 mg/dl    ALT 15 12 - 78 U/L   Troponin    Collection Time: 01/09/18  7:40 PM   Result Value Ref Range    Troponin I 0.05 ng/ml   Lactic Acid    Collection Time: 01/09/18  7:40 PM   Result Value Ref Range    Lactate 2.90 (H) 0.90 - 1.70 mmol/L   APTT    Collection Time: 01/09/18  7:40 PM   Result Value Ref Range    aPTT 24.0 22.0 - 38.0 seconds   Glomerular Filtration Rate, Estimated    Collection Time: 01/09/18  7:40 PM   Result Value Ref Range    GFR, Estimated 56 (A) ml/min/1.73m2   ANION GAP    Collection Time: 01/09/18  7:40 PM   Result Value Ref Range    Anion Gap 7.0 (L) 8.0 - 16.0 meq/l   Protime    Collection Time: 01/09/18  7:40 PM   Result Value Ref Range    INR 1.07 0.85 - 1.13   Scan of Blood Smear    Collection Time: 01/09/18  7:40 PM   Result Value Ref Range    SCAN OF BLOOD SMEAR see below    Blood occult stool screen #1    Collection Time: 01/09/18  8:00 PM   Result Value Ref Range    OCCULT BLOOD FECAL POSITIVE NEGATIVE   Urine reflex to culture    Collection Time: 01/09/18  8:35 PM   Result Value Ref Range    Glucose, Urine NEGATIVE NEGATIVE mg/dl Bilirubin Urine NEGATIVE     Ketones, Urine NEGATIVE NEGATIVE    Specific Gravity, UA 1.020 1.002 - 1.03    Blood, Urine TRACE (A) NEGATIVE    pH, UA 6.5 5.0 - 9.0    Protein, UA TRACE (A) NEGATIVE mg/dl    Urobilinogen, Urine 0.2 0.0 - 1.0 eu/dl    Nitrite, Urine POSITIVE (A) NEGATIVE    Leukocyte Esterase, Urine MODERATE (A) NEGATIVE    Color, UA YELLOW STRAW-YELL    Character, Urine SL CLOUDY CLEAR-SL C    RBC, UA 0-2 0-2/hpf /hpf    WBC, UA >100W/CLUMPS 0-4/hpf /hpf    Epi Cells NONE 3-5/hpf /hpf    Amorphous, UA NONE SEEN none seen    Mucus, UA NONE SEEN none seen    Bacteria, UA MANY few/none s    Casts UA NONE SEEN none seen /lpf    Crystals NONE SEEN none seen    REFLEX TO URINE C & S INDICATED    CBC    Collection Time: 01/09/18 11:03 PM   Result Value Ref Range    WBC 4.3 (L) 4.8 - 10.8 thou/mm3    RBC 2.54 (L) 4.20 - 5.40 mill/mm3    Hemoglobin 7.0 (LL) 12.0 - 16.0 gm/dl    Hematocrit 21.6 (L) 37.0 - 47.0 %    MCV 85.0 81.0 - 99.0 fL    MCH 27.4 27.0 - 31.0 pg    MCHC 32.2 (L) 33.0 - 37.0 gm/dl    RDW 16.5 (H) 11.5 - 14.5 %    Platelets 123 186 - 549 thou/mm3    MPV 8.9 7.4 - 10.4 mcm   APTT    Collection Time: 01/09/18 11:03 PM   Result Value Ref Range    aPTT 26.0 22.0 - 38.0 seconds   Protime-INR    Collection Time: 01/09/18 11:03 PM   Result Value Ref Range    INR 1.03 0.85 - 1.13   Hepatic Function Panel    Collection Time: 01/09/18 11:03 PM   Result Value Ref Range    Alb 2.8 (L) 3.5 - 5.1 g/dL    Total Bilirubin 0.2 (L) 0.3 - 1.2 mg/dL    Bilirubin, Direct <0.2 0.0 - 0.3 mg/dL    Alkaline Phosphatase 52 38 - 126 U/L    AST 13 5 - 40 U/L    ALT 8 (L) 11 - 66 U/L    Total Protein 4.6 (L) 6.1 - 8.0 g/dL   TYPE AND SCREEN    Collection Time: 01/09/18 11:03 PM   Result Value Ref Range    ABO A     Rh Factor POS     Antibody Screen NEG    Troponin    Collection Time: 01/09/18 11:03 PM   Result Value Ref Range    Troponin T < 0.010 ng/ml   Lactic Acid, Plasma    Collection Time: 01/10/18 12:28 AM   Result

## 2018-01-10 NOTE — ED PROVIDER NOTES
Musculoskeletal: Negative for arthralgias, back pain, joint swelling, myalgias and neck stiffness. Skin: Negative for color change and rash. Neurological: Positive for dizziness. Negative for tremors, seizures, syncope, weakness, numbness and headaches. Psychiatric/Behavioral: Positive for confusion. Negative for agitation, hallucinations, sleep disturbance and suicidal ideas. The patient is not nervous/anxious. All other systems reviewed and are negative. PAST MEDICAL HISTORY    has a past medical history of Anxiety; CAD (coronary artery disease); COPD (chronic obstructive pulmonary disease) ([facility] [address] Dementia; Hyperlipidemia; Hypertension; Hypothyroidism; TIA (transient ischemic attack); and Urinary incontinence. SURGICAL HISTORY      has a past surgical history that includes Colonoscopy (5/2005); Hysterectomy; Cardiac catheterization; and sinus surgery. CURRENT MEDICATIONS       Previous Medications    ALBUTEROL (PROVENTIL) (5 MG/ML) 0.5% NEBULIZER SOLUTION    Take 1 mL by nebulization 4 times daily    ALPRAZOLAM (XANAX) 1 MG TABLET    Take 0.5 tablets by mouth 3 times daily as needed for Anxiety . ASPIRIN 81 MG EC TABLET    Take 1 tablet by mouth daily    ATORVASTATIN (LIPITOR) 10 MG TABLET    TAKE ONE TABLET DAILY, BY MOUTH. CLOPIDOGREL (PLAVIX) 75 MG TABLET    TAKE ONE TABLET DAILY, BY MOUTH. DONEPEZIL (ARICEPT) 10 MG TABLET    TAKE ONE TABLET DAILY AT BEDTIME, BY MOUTH.    ISOSORBIDE MONONITRATE (IMDUR) 30 MG EXTENDED RELEASE TABLET    TAKE ONE TABLET DAILY, BY MOUTH. LEVOTHYROXINE (SYNTHROID) 100 MCG TABLET    TAKE ONE TABLET DAILY, BY MOUTH.    LISINOPRIL (PRINIVIL;ZESTRIL) 20 MG TABLET    Take 1 tablet by mouth daily    LORATADINE (CLARITIN) 10 MG TABLET    Take 1 tablet by mouth daily    METOPROLOL SUCCINATE (TOPROL XL) 50 MG EXTENDED RELEASE TABLET    TAKE ONE TABLET DAILY, BY MOUTH.     NITROGLYCERIN (NITROLINGUAL) 0.4 MG/SPRAY SPRAY    Place 1 spray under the tongue every 5 minutes as needed for Chest pain. QUETIAPINE (SEROQUEL) 100 MG TABLET    TAKE ONE TABLET DAILY AT BEDTIME, BY MOUTH.    SODIUM CHLORIDE (OCEAN) 0.65 % NASAL SPRAY    1 spray by Nasal route as needed for Congestion    VENLAFAXINE (EFFEXOR XR) 150 MG EXTENDED RELEASE CAPSULE    TAKE ONE CAPSULE DAILY, BY MOUTH. ALLERGIES     is allergic to namenda [memantine hcl]. FAMILY HISTORY     indicated that her mother is . She indicated that her father is . family history includes Heart Disease in her mother; Stroke in her mother. SOCIAL HISTORY      reports that she has quit smoking. Her smoking use included Cigarettes. She has a 80.00 pack-year smoking history. She has never used smokeless tobacco. She reports that she does not drink alcohol or use drugs. PHYSICAL EXAM     INITIAL VITALS:  height is 5' 5\" (1.651 m) and weight is 145 lb (65.8 kg). Her oral temperature is 97.5 °F (36.4 °C). Her blood pressure is 134/40 (abnormal) and her pulse is 69. Her respiration is 20 and oxygen saturation is 95%. Physical Exam   Constitutional: She is oriented to person, place, and time. She appears well-developed and well-nourished. No distress. HENT:   Head: Normocephalic and atraumatic. Right Ear: External ear normal.   Left Ear: External ear normal.   Nose: Nose normal.   Mouth/Throat: Oropharynx is clear and moist.   No active bleeding noted in the nares. Eyes: Conjunctivae and EOM are normal. Pupils are equal, round, and reactive to light. Right eye exhibits no discharge. Left eye exhibits no discharge. No scleral icterus. Neck: Normal range of motion. Neck supple. No JVD present. No tracheal deviation present. No thyromegaly present. Cardiovascular: Normal rate, regular rhythm, normal heart sounds and intact distal pulses. Exam reveals no gallop and no friction rub. No murmur heard. Pulmonary/Chest: Effort normal and breath sounds normal. No stridor. No respiratory distress. She has no wheezes. She has no rales. She exhibits no tenderness. Abdominal: Soft. Bowel sounds are normal. She exhibits no distension and no mass. There is no tenderness. There is no rebound and no guarding. Genitourinary: Rectal exam shows guaiac positive stool. Musculoskeletal: Normal range of motion. She exhibits no edema or tenderness. Lymphadenopathy:     She has no cervical adenopathy. Neurological: She is alert and oriented to person, place, and time. She has normal reflexes. No cranial nerve deficit. She exhibits normal muscle tone. Coordination normal.   Skin: Skin is warm and dry. No rash (on exposed skin) noted. She is not diaphoretic. Psychiatric: She has a normal mood and affect. Her behavior is normal.   Nursing note and vitals reviewed. DIFFERENTIAL DIAGNOSIS:   Acute GI bleed, sepsis, CVA, TIA,    DIAGNOSTIC RESULTS     EKG: All EKG's are interpreted by the Emergency Department Physician who either signs or Co-signs this chart in the absence of a cardiologist.  Sinus rhythm, no axis deviation, no acute ST elevation noted. RADIOLOGY: non-plain film images(s) such as CT, Ultrasound and MRI are read by the radiologist.  Plain radiographic images are visualized and preliminarily interpreted by the emergency physician unless otherwise stated below.       LABS:   Labs Reviewed   CBC WITH AUTO DIFFERENTIAL - Abnormal; Notable for the following:        Result Value    WBC 3.9 (*)     RBC 2.63 (*)     Hemoglobin 7.1 (*)     Hematocrit 21.5 (*)     MCH 26.8 (*)     MCHC 32.9 (*)     RDW 14.7 (*)     SEGS 42.3 (*)     All other components within normal limits   COMPREHENSIVE METABOLIC PANEL - Abnormal; Notable for the following:     Glucose 130 (*)     BUN 30 (*)     POC CALCIUM 7.8 (*)     Total Protein 5.3 (*)     Alb 2.5 (*)     All other components within normal limits   URINE RT REFLEX TO CULTURE - Abnormal; Notable for the following:     Blood, Urine TRACE (*)     Protein, UA TRACE (*)

## 2018-01-11 NOTE — ANESTHESIA PRE PROCEDURE
Department of Anesthesiology  Preprocedure Note       Name:  Syed Mo   Age:  80 y.o.  :  1935                                          MRN:  [id number]         Date:  2018      Surgeon: Valentín Rivas):  Paradise Giang MD    Procedure: Procedure(s):  EGD ESOPHAGOGASTRODUODENOSCOPY    Medications prior to admission:   Prior to Admission medications    Medication Sig Start Date End Date Taking? Authorizing Provider   metoprolol succinate (TOPROL XL) 50 MG extended release tablet TAKE ONE TABLET DAILY, BY MOUTH. 17  Yes Stew Hayes MD   ALPRAZolam [place]) 1 MG tablet Take 0.5 tablets by mouth 3 times daily as needed for Anxiety .  17  Yes Stew Hayes MD   donepezil (ARICEPT) 10 MG tablet TAKE ONE TABLET DAILY AT BEDTIME, BY MOUTH. 17  Yes Stew Hayes MD   QUEtiapine (SEROQUEL) 100 MG tablet TAKE ONE TABLET DAILY AT BEDTIME, BY MOUTH. 10/6/17  Yes Stew Hayes MD   clopidogrel (PLAVIX) 75 MG tablet TAKE ONE TABLET DAILY, BY MOUTH. 10/2/17  Yes Stew Hayes MD   lisinopril (PRINIVIL;ZESTRIL) 20 MG tablet Take 1 tablet by mouth daily 17  Yes Stew Hayes MD   isosorbide mononitrate (IMDUR) 30 MG extended release tablet TAKE ONE TABLET DAILY, BY MOUTH. 17  Yes Stew Hayes MD   venlafaxine (EFFEXOR XR) 150 MG extended release capsule TAKE ONE CAPSULE DAILY, BY MOUTH. 17  Yes Stew Hayes MD   atorvastatin (LIPITOR) 10 MG tablet TAKE ONE TABLET DAILY, BY MOUTH. 17  Yes Stew Hayes MD   aspirin 81 MG EC tablet Take 1 tablet by mouth daily 3/31/16  Yes Hiram Simms MD   levothyroxine (SYNTHROID) 100 MCG tablet TAKE ONE TABLET DAILY, BY MOUTH. 1/10/18   Stew Hayes MD   loratadine (CLARITIN) 10 MG tablet Take 1 tablet by mouth daily 9/15/17   Stew Hayes MD   albuterol (PROVENTIL) (5 MG/ML) 0.5% nebulizer solution Take 1 mL by nebulization 4 times daily 17   Stew Hayes MD   sodium chloride (OCEAN) 0.65 % nasal spray 1 spray by Nasal route Lab Results   Component Value Date    INR 1.03 01/09/2018    APTT 26.0 01/09/2018       HCG (If Applicable): No results found for: PREGTESTUR, PREGSERUM, HCG, HCGQUANT     ABGs: No results found for: PHART, PO2ART, WXT7SHV, LZS1HBH, BEART, X5DCLBAQ     Type & Screen (If Applicable):  Lab Results   Component Value Date    [address] 01/09/2018       Anesthesia Evaluation  Patient summary reviewed and Nursing notes reviewed  Airway: Mallampati: II  TM distance: >3 FB   Neck ROM: full  Mouth opening: > = 3 FB Dental:    (+) upper dentures      Pulmonary:   (+) COPD: mild and no interval change,  decreased breath sounds,                             Cardiovascular:    (+) hypertension: moderate and no interval change, CAD: no interval change, hyperlipidemia      ECG reviewed  Rhythm: regular  Rate: normal  Echocardiogram reviewed         Beta Blocker:  Dose within 24 Hrs         Neuro/Psych:   (+) TIA, psychiatric history: stable with treatment            GI/Hepatic/Renal:             Endo/Other:                     Abdominal:       Abdomen: soft. Vascular: negative vascular ROS. Anesthesia Plan      MAC     ASA 3       Induction: intravenous. Anesthetic plan and risks discussed with patient. Plan discussed with attending.                   Rama Kraft CRNA   1/11/2018

## 2018-01-11 NOTE — PROGRESS NOTES
RDW 15.7 (H) 11.5 - 14.5 %    Platelets 930 626 - 086 thou/mm3    MPV 9.0 7.4 - 10.4 mcm   Hemoglobin and hematocrit, blood    Collection Time: 01/11/18 12:57 PM   Result Value Ref Range    Hemoglobin 9.5 (L) 12.0 - 16.0 gm/dl    Hematocrit 28.2 (L) 37.0 - 47.0 %       Radiology:     Ct Abdomen Pelvis Wo Iv Contrast Additional Contrast? None    Result Date: 1/9/2018  PROCEDURE: CT ABDOMEN PELVIS WO IV CONTRAST CLINICAL INFORMATION: ABDOMINAL PAIN, rectal bleeding,confusion . COMPARISON: No prior study. TECHNIQUE: Noncontrast images of the abdomen and pelvis with multiplanar reconstructions. All CT scans at this facility use dose modulation, iterative reconstruction, and/or weight-based dosing when appropriate to reduce radiation dose to as low as reasonably achievable. FINDINGS: Lung bases Large hiatal hernia. No infiltrates or effusions. Abdomen pelvis Solid or evaluation is limited without IV contrast also note that the entire liver volume is not included on the exam. Visualized portion of the liver is unremarkable. Spleen is normal. Adrenals are normal. Kidneys are unremarkable. Pancreas is a atrophic. Aorta is heavily atherosclerotic. No evidence of bowel obstruction. No abnormal fluid collections or free air. No suspicious bone lesions. No acute process identified **This report has been created using voice recognition software. It may contain minor errors which are inherent in voice recognition technology. ** Final report electronically signed by Dr. Keith Mejia on 1/9/2018 8:40 PM    Ct Head Wo Contrast    Result Date: 1/9/2018  PROCEDURE: CT HEAD WO CONTRAST CLINICAL INFORMATION: SYNCOPE/FAINTING, TIA, . COMPARISON: No prior study. TECHNIQUE: Noncontrast 5 mm axial images were obtained through the brain. All CT scans at this facility use dose modulation, iterative reconstruction, and/or weight-based dosing when appropriate to reduce radiation dose to as low as reasonably achievable.  FINDINGS: There is no hemorrhage. There are no intra-or extra-axial collections. Asymmetric enlargement of the posterior horn the right lateral ventricle of uncertain etiology. Possibly related to remote right occipital infarctions. The gray-white matter differentiation is preserved. Small lacunar infarcts are seen in the basal ganglia bilaterally. Acute bilateral maxillary sinusitis. Sphenoid sinusitis. Mastoid air cells are clear nasal septum is deviated to the right. There is no suspicious calvarial abnormality. No acute intracranial pathology. Acute maxillary sinusitis. **This report has been created using voice recognition software. It may contain minor errors which are inherent in voice recognition technology. ** Final report electronically signed by Dr. Ezequiel Leary on 1/9/2018 8:36 PM    Xr Chest 1 Vw    Result Date: 1/9/2018  PROCEDURE: XR CHEST LIMITED ONE VIEW CLINICAL INFORMATION: altered mental status,  . COMPARISON: 9/14/2017 TECHNIQUE: Single AP upright FINDINGS: Heart size is enlarged. Pulmonary vessels are not congested. No infiltrates or effusions. Coronary artery stenting is noted. No suspicious bone lesions     Cardiomegaly **This report has been created using voice recognition software. It may contain minor errors which are inherent in voice recognition technology. ** Final report electronically signed by Dr. Ezequiel Leary on 1/9/2018 8:38 PM        EKG: Normal sinus rhythm  Minimal voltage criteria for LVH, may be normal variant  Borderline ECG      Assessment / Plan:    1.   GI bleed- s/p transfuse 2 units, H/h q 8 hrs, Protonix IV, gi consulted- had egd today- no bleeding source found- gastritis  2. HTN (hypertension), benign- resume home meds with parameters to hold  3. Presence of stent in LAD, RAD coronary arteries- resume Metoprolol, Imdur, Lisinopril, lipitor, Nitro sl prn  4.    Metabolic encephalopathy acute-  severe anemia- CT head (-),   5.   Weakness, poss d/t anemia- TSH, trend troponin, EKG,

## 2018-01-11 NOTE — BRIEF OP NOTE
Brief Postoperative Note  ______________________________________________________________    Patient:  Katelin Grimaldo  YOB: 1935  MRN: [id number]  Date of Procedure: 1/11/2018    Pre-Op Diagnosis: gi bleed    Post-Op Diagnosis: Same       Procedure(s):  EGD ESOPHAGOGASTRODUODENOSCOPY    Anesthesia: Monitor Anesthesia Care    Surgeon(s):  Elaina Francisco MD    Staff:  Scrub Person First: Alric Letters     Estimated Blood Loss: * No values recorded between 1/11/2018  1:10 PM and 1/11/2018  4:84 PM * mL    Complications: None    Specimens:   * No specimens in log *    Implants:  * No implants in log *      Drains:      Findings: gastric erosions bx, hiatal hernia    Elaina Francisco MD  Date: 1/11/2018  Time: 1:28 PM

## 2018-01-11 NOTE — CONSULTS
# [id number]  Please see my dictated report.
of oropharynx. NECK:  Supple. No JVD. No thyromegaly. CHEST:  No axillary or supraclavicular adenopathy. LUNGS:  Equal to expansion on inspection. Adequate air entry bilaterally  on percussion and auscultation. HEART:  Regular. Normal S1 and S2. No S3 or murmurs. ABDOMEN:  Soft, normoactive bowel sounds. Nontender. No palpable masses. No appreciable hernias. No rebound or guarding. RECTAL:  Examination deferred. EXTREMITIES:  No clubbing, cyanosis or edema. SKIN:  No skin rash. DIAGNOSTIC DATA:  CT scan of the abdomen and pelvis was reported by the  radiologist, at lung bases large hiatal hernia. No infiltrates or  effusions. Abdomen and pelvis solid. Organ evaluation is limited without  IV contrast.  The entire liver volume is not included on the exam.   Visualized portions of the liver is unremarkable. Spleen is normal.   Pancreas is atrophic. No evidence of bowel obstruction. IMPRESSION:  1. An 80year-old pleasant female who has anemia and dark stools, most  likely upper GI bleeding, maybe complicated by aspirin and Plavix. Less  likely of lower GI bleeding. 2.  History of coronary artery disease, on aspirin and Plavix. 3.  Anxiety. 4.  Hypocalcemia. RECOMMENDATIONS:  Hold off aspirin and Plavix for now. Continue Protonix  IV drip and proceed with EGD. I have discussed with the patient regarding  the EGD, its indications and complications including but not limited to  perforation, bleeding, infection, adverse reaction to medicine, very slight  chance of missing significant lesions. The patient expressed her  understanding. Further plans pending the above test results. Thank you Dr. Connor Cervantes for letting me see the patient. Do not hesitate to call  me if you have any questions. My recommendations are above.         Etta Ortiz M.D.    D: 01/10/2018 23:23:56       T: 01/10/2018 23:28:11     [id number]  Job#: [id number]     Doc#: [id number]    CC:  Cosmo Ramsey MD, Winter Carvalho

## 2018-01-12 NOTE — PROGRESS NOTES
Discharge teaching and instructions for diagnosis/procedure of GI Bleed completed with patient using teachback method. AVS reviewed. Printed prescriptions given to patient. Patient voiced understanding regarding prescriptions, follow up appointments, and care of self at home.  Discharged in a wheelchair to  home with support per family

## 2018-01-12 NOTE — OP NOTE
maxilla and  mandible. After the adequate total intravenous anesthesia was given by  Anesthesia, the  gastroscope was inserted into the oropharynx, was  intubated under direct vision. The scope was advanced down through the  stomach into second portion of duodenum. On careful inspection and on  withdrawal of the scope, the duodenum looks normal as shown in picture #1. Antrum of the stomach looks normal as in picture #2. In the body of the  stomach, linear gastric erosions noted as shown in picture #3, #5, #6 and  #7. Biopsies obtained for EDYTA-test.  On retroflex, in the fundus, the  patient had moderate-size hiatal hernia noted as shown in picture #4. Hiatal hernia extending from extending from 40 up to 32 cm from the bite  block indicating 8 cm hiatal hernia with and proximal esophagus looks  normal as shown in picture #8. Air was withdrawn as the scope was removed. The patient tolerated the procedure well without any immediate  complications. Vitals including blood pressure, heart rate and pulse ox  were stable during the procedure and after the procedure. The  patient  transferred to the recovery room in stable condition. IMPRESSION:  Esophagogastroduodenoscopy to second portion of duodenum,  gastric erosions noted, 8 cm hiatal hernia noted. RECOMMENDATIONS:  Antireflux instructions. Protonix 40 mg p.o. daily. Advance diet to the low-fat diet. Follow the biopsy results. If positive,  consider triple therapy. Hiatal hernia instructions. Thank you Dr. Malinda Mendoza and Dr. Kendra Wilson for letting me to do procedure on this  patient. Do not hesitate to call me if you have any questions. My  recommendations are as above. Jacek Hayward M.D.    D: 01/11/2018 13:32:33       T: 01/11/2018 13:33:59     [id number]  Job#: [id number]     Doc#: [id number]    CC:  Luwana Apley, M.D. Eloisa Muss, M.D. Leora Litter, M.D.

## 2018-01-12 NOTE — CARE COORDINATION
1/12/18, 9:24 AM    Discussed in collaborative rounds, planning discharge today. Spoke with [facility] again, she plans home alone, denies need for [facility] or [facility]. Confirmed with Vaishali PEÑA that [facility] moves well. Discharge plan discussed by  and . Discharge plan reviewed with patient/ family. Patient/ family verbalize understanding of discharge plan and are in agreement with plan. Understanding was demonstrated using the teach back method.        IMM Letter  IMM Letter given to Patient/Family/Significant other/Guardian/POA/by[de-identified] Gisell Castro CM   IMM Letter date given[de-identified] 01/12/18  IMM Letter time given[de-identified] [id number]

## 2018-01-12 NOTE — PLAN OF CARE
Problem: Falls - Risk of  Intervention: Fall risk assessment  Patient able to ambulate with standby assistance. Intervention: Fall precautions  Hourly rounding and bed alarm in place. Call light reinforced. Goal: Absence of falls  Outcome: Ongoing  No falls this shift, bed alarm on, and falling star precautions in place. Problem: Discharge Planning:  Goal: Participates in care planning  Participates in care planning   Outcome: Ongoing  Updated on plan of care, encouraged to express concerns. Verbalizes understanding. Problem: Cardiac Output - Decreased:  Goal: Hemodynamic stability will improve  Hemodynamic stability will improve   Outcome: Ongoing  Vitals stable. NSR on monitor. Hemoglobin remains stable at 9.2. EGD done on Thursday which showed gastric erosion, hiatal hernia, no signs of bleeding. Biopsy sent. Problem: Fluid Volume - Imbalance:  Goal: Absence of imbalanced fluid volume signs and symptoms  Absence of imbalanced fluid volume signs and symptoms   Outcome: Ongoing  Strict I&O being monitored. Problem: Nutrition Deficit:  Goal: Ability to achieve adequate nutritional intake will improve  Ability to achieve adequate nutritional intake will improve   Outcome: Ongoing  Patient on low fat diet, tolerating well. Problem: Pain:  Goal: Pain level will decrease  Pain level will decrease   Outcome: Met This Shift  Denies presence of any pain. Will continue to monitor. Problem: Skin Integrity - Impaired:  Goal: Will show no infection signs and symptoms  Will show no infection signs and symptoms   Outcome: Ongoing  WBC 4.7. Afebrile. Will monitor AM labwork. Comments: Care plan reviewed with patient. Patient verbalizes understanding of the plan of care and contribute to goal setting.

## 2018-01-12 NOTE — DISCHARGE SUMMARY
discharge    Patient: Lisset Adamson  YOB: 1935  Date of Service: 1/12/2018  MRN: [id number]   Acct:  [de-identified]   Primary Care Physician: Fredis Boland MD    Chief Complaint: Nose bleed, Dark stools, Anemia, Fatigue    History of Present Illness:   History obtained from chart review and the patient. The patient is a 80 y.o. female with CAD,  COPD,  Dementia; Hyperlipidemia; Hypertension; Hypothyroidism; TIA,  Urinary incontinence who presents from [facility] where she was evaluated for complaint of weakness. Patient's family are no longer at the bedside. Patient relates that she had nose bleed 3 days ago. She denies falls. Per ED report, nosebleeds had been intermittent for the past few days. Patient denies falls or trauma. Family also mentioned that patient had been more confused lately. Patient seemed to get more confused when she stands up but no history of falls or head trauma. The patient denies chest pain, shortness of breath,  abdominal pain, cough, fever, chills, nausea, vomiting, diarrhea or dysuria. Initial labs show H/H 7.1/21.5. Hemocult stool is positive for blood. Patient notes colonoscopy in the past 5 years but is unsure of dates. Patient does take aspirin and plavix and these are being withheld. Patient has been typed and crossmatched for PRBCs and will be receiving 2 units soon. GI has been consulted.       Subjective:-    Doing better  No confusion  Had egd- no bleeding source found  Hbg stable    Scheduled Meds:   pantoprazole  40 mg Oral QAM AC    cetirizine  5 mg Oral Daily    cefTRIAXone (ROCEPHIN) IV  1 g Intravenous Q24H    QUEtiapine  100 mg Oral Nightly    atorvastatin  10 mg Oral Nightly    donepezil  10 mg Oral Nightly    isosorbide mononitrate  30 mg Oral Daily    levothyroxine  100 mcg Oral Daily    lisinopril  20 mg Oral Daily    metoprolol succinate  50 mg Oral Daily    venlafaxine  150 mg Oral Daily with breakfast    calcium replacement protocol   Other thou/mm3    MPV 9.0 7.4 - 10.4 mcm   Hemoglobin and hematocrit, blood    Collection Time: 01/12/18 12:09 PM   Result Value Ref Range    Hemoglobin 10.3 (L) 12.0 - 16.0 gm/dl    Hematocrit 31.4 (L) 37.0 - 47.0 %       Radiology:     Ct Abdomen Pelvis Wo Iv Contrast Additional Contrast? None    Result Date: 1/9/2018  PROCEDURE: CT ABDOMEN PELVIS WO IV CONTRAST CLINICAL INFORMATION: ABDOMINAL PAIN, rectal bleeding,confusion . COMPARISON: No prior study. TECHNIQUE: Noncontrast images of the abdomen and pelvis with multiplanar reconstructions. All CT scans at this facility use dose modulation, iterative reconstruction, and/or weight-based dosing when appropriate to reduce radiation dose to as low as reasonably achievable. FINDINGS: Lung bases Large hiatal hernia. No infiltrates or effusions. Abdomen pelvis Solid or evaluation is limited without IV contrast also note that the entire liver volume is not included on the exam. Visualized portion of the liver is unremarkable. Spleen is normal. Adrenals are normal. Kidneys are unremarkable. Pancreas is a atrophic. Aorta is heavily atherosclerotic. No evidence of bowel obstruction. No abnormal fluid collections or free air. No suspicious bone lesions. No acute process identified **This report has been created using voice recognition software. It may contain minor errors which are inherent in voice recognition technology. ** Final report electronically signed by Dr. Juan Pablo Duran on 1/9/2018 8:40 PM    Ct Head Wo Contrast    Result Date: 1/9/2018  PROCEDURE: CT HEAD WO CONTRAST CLINICAL INFORMATION: SYNCOPE/FAINTING, TIA, . COMPARISON: No prior study. TECHNIQUE: Noncontrast 5 mm axial images were obtained through the brain. All CT scans at this facility use dose modulation, iterative reconstruction, and/or weight-based dosing when appropriate to reduce radiation dose to as low as reasonably achievable. FINDINGS: There is no hemorrhage.  There are no intra-or extra-axial

## 2018-01-16 NOTE — CARE COORDINATION
TAKE ONE TABLET DAILY, BY MOUTH. 1/30/17   Tania Ware MD   sodium chloride (OCEAN) 0.65 % nasal spray 1 spray by Nasal route as needed for Congestion 10/3/16   Tania Ware MD   nitroGLYCERIN (NITROLINGUAL) 0.4 MG/SPRAY spray Place 1 spray under the tongue every 5 minutes as needed for Chest pain.  4/17/13   Rhiannon Marquez, DO       Future Appointments  Date Time Provider Danae Ortiz   1/16/2018 4:00 PM Tania Ware MD [facility]     General Assessment    Do you have any symptoms that are causing concern?:  Yes  Progression since Onset:  Gradually Improving  Reported Symptoms:  (Comment: GI bleed )      and

## 2018-01-18 NOTE — PROGRESS NOTES
Post-Discharge Transitional Care Management Services      Janna Santamaria   YOB: 1935    Date of Visit:  1/16/2018     HPI  Here for recheck after hospitalization for GI bleed. She had an EGD showing ulcerations. She is doing much better now and feeling back to baseline. Allergies   Allergen Reactions    Namenda [Memantine Hcl]      dizziness     Outpatient Prescriptions Marked as Taking for the 1/16/18 encounter (Office Visit) with Misty Hathaway MD   Medication Sig Dispense Refill    lisinopril (PRINIVIL;ZESTRIL) 30 MG tablet Take 1 tablet by mouth daily 90 tablet 5    pantoprazole (PROTONIX) 40 MG tablet Take 1 tablet by mouth every morning (before breakfast) 30 tablet 3    levothyroxine (SYNTHROID) 100 MCG tablet TAKE ONE TABLET DAILY, BY MOUTH. 90 tablet 0    metoprolol succinate (TOPROL XL) 50 MG extended release tablet TAKE ONE TABLET DAILY, BY MOUTH. 30 tablet 0    ALPRAZolam (XANAX) 1 MG tablet Take 0.5 tablets by mouth 3 times daily as needed for Anxiety . 30 tablet 0    donepezil (ARICEPT) 10 MG tablet TAKE ONE TABLET DAILY AT BEDTIME, BY MOUTH. 90 tablet 3    QUEtiapine (SEROQUEL) 100 MG tablet TAKE ONE TABLET DAILY AT BEDTIME, BY MOUTH. 30 tablet 11    loratadine (CLARITIN) 10 MG tablet Take 1 tablet by mouth daily 30 tablet 11    albuterol (PROVENTIL) (5 MG/ML) 0.5% nebulizer solution Take 1 mL by nebulization 4 times daily 120 each 3    isosorbide mononitrate (IMDUR) 30 MG extended release tablet TAKE ONE TABLET DAILY, BY MOUTH. 90 tablet 3    venlafaxine (EFFEXOR XR) 150 MG extended release capsule TAKE ONE CAPSULE DAILY, BY MOUTH. 30 capsule 11    atorvastatin (LIPITOR) 10 MG tablet TAKE ONE TABLET DAILY, BY MOUTH.  90 tablet 3    sodium chloride (OCEAN) 0.65 % nasal spray 1 spray by Nasal route as needed for Congestion 1 Bottle 3         Vitals:    01/16/18 1606 01/16/18 1611   BP: (!) 180/70 (!) 180/72   Site: Left Arm Right Arm   Position: Sitting    Cuff Size: Small Adult    Pulse: 62    Resp: 16    Weight: 148 lb 6.4 oz (67.3 kg)      Body mass index is 24.7 kg/m². Wt Readings from Last 3 Encounters:   01/16/18 148 lb 6.4 oz (67.3 kg)   01/12/18 142 lb (64.4 kg)   09/22/17 142 lb (64.4 kg)     BP Readings from Last 3 Encounters:   01/16/18 (!) 180/72   01/12/18 (!) 164/70   01/11/18 132/74        Patient was admitted to [facility] from 1-9-18 to 1-12-18 for Gi bleed. Inpatient course: Discharge summary reviewed- see chart. Current status: good    Review of Systems:  Review of Systems  Constitutional: Negative for fever, chills, diaphoresis, activity change, appetite change and fatigue. HENT: Negative for hearing loss, ear pain, congestion, sore throat, rhinorrhea, postnasal drip and ear discharge. Eyes: Negative for photophobia and visual disturbance. Respiratory: Negative for cough, chest tightness, shortness of breath and wheezing. Cardiovascular: Negative for chest pain and leg swelling. Gastrointestinal: Negative for nausea, vomiting, abdominal pain, diarrhea and constipation. Genitourinary: Negative for dysuria, urgency and frequency. Neurological: Negative for weakness, light-headedness and headaches. Psychiatric/Behavioral: Negative for sleep disturbance. Physical Exam:  Physical Exam  Physical Exam   Constitutional: Vital signs are normal. She appears well-developed and well-nourished. She is active. HENT:   Head: Normocephalic and atraumatic. Right Ear: Tympanic membrane, external ear and ear canal normal. No drainage or tenderness. Left Ear: Tympanic membrane, external ear and ear canal normal. No drainage or tenderness. Nose: Nose normal. No mucosal edema or rhinorrhea. Mouth/Throat: Uvula is midline, oropharynx is clear and moist and mucous membranes are normal. Mucous membranes are not pale. Normal dentition. No posterior oropharyngeal edema or posterior oropharyngeal erythema.    Eyes: Lids are normal. Right eye exhibits no chemosis and no discharge. Left eye exhibits no chemosis and no drainage. Right conjunctiva has no hemorrhage. Left conjunctiva has no hemorrhage. Right eye exhibits normal extraocular motion. Left eye exhibits normal extraocular motion. Right pupil is round and reactive. Left pupil is round and reactive. Pupils are equal.   Cardiovascular: Normal rate, regular rhythm, S1 normal, S2 normal and normal heart sounds. Exam reveals no gallop. No murmur heard. Pulmonary/Chest: Effort normal and breath sounds normal. No respiratory distress. She has no wheezes. She has no rhonchi. She has no rales. Abdominal: Soft. Normal appearance and bowel sounds are normal. She exhibits no distension and no mass. There is no hepatosplenomegaly. No tenderness. She has no rigidity, no rebound and no guarding. No hernia. Musculoskeletal:        Right lower leg: She exhibits no edema. Left lower leg: She exhibits no edema. Neurological: She is alert. Initial post-discharge communication occurred between nurse care coordinator and patient on 1-16-18- see documentation in chart: telephone encounter. Assessment/Plan: Amee Carcamo was seen today for follow-up from hospital.    Diagnoses and all orders for this visit:    Gastrointestinal hemorrhage associated with gastric ulcer  -     Comprehensive Metabolic Panel; Future    Acute cystitis without hematuria  -     Urinalysis; Future    Hypertension, uncontrolled    HTN (hypertension), benign  -     lisinopril (PRINIVIL;ZESTRIL) 30 MG tablet; Take 1 tablet by mouth daily    Blood loss anemia  -     CBC; Future  -     Iron; Future    Impacted cerumen, bilateral  -     Cancel: DC REMOVAL IMPACTED CERUMEN IRRIGATION/LVG UNILAT    Renal dysfunction  -     Comprehensive Metabolic Panel;  Future    No change to medications   Continue healthy diet and exercise        Diagnostic test results reviewed: inpatient labs, EKG, chest x-ray, x-ray-chest, CT-head and

## 2018-01-24 PROBLEM — R73.9 HYPERGLYCEMIA: Status: RESOLVED | Noted: 2018-01-01 | Resolved: 2018-01-01

## 2018-01-24 NOTE — PROGRESS NOTES
Kindra Louis is a 80 y.o. female is here for  Prior  Cad and stents  And had GI bleed and they stopped asa and plavix now has episode of amnestic and some weakness in left eye brow and possible arrhythmia and has had no chest pains . Intensity of the pain none , provocation of the pain non e, what relieves the pain none , where does  the pain migrates to non  and  No nausea no fever and chills and no sob with and without activity.  No evidence of swelling in legs no palpitations and no syncopal episodes and no dizziness ,no bleeding ,or urination  Problems ,no double visions ,no speech problems and no bowel problems or diarrhea and tolerating medications     Patient Active Problem List   Diagnosis    HTN (hypertension), benign    Hyperlipemia    S/P angioplasty with stent    Presence of stent in LAD coronary artery    RCA  stent     Bilateral carotid artery stenosis    Hyperlipidemia    Hypertension, uncontrolled    GI bleed    Altered mental status    Weakness    Acute maxillary sinusitis    Epistaxis    UTI (urinary tract infection)    Hypocalcemia    Hyperglycemia    Acute metabolic encephalopathy     Past Medical History:   Diagnosis Date    Anxiety     CAD (coronary artery disease)     Dr Ophelia Beck COPD (chronic obstructive pulmonary disease) ([facility] [address]     Dementia     Hyperlipidemia     Hypertension     Hypothyroidism     TIA (transient ischemic attack) 3/2013    Urinary incontinence      Social History   Substance Use Topics    Smoking status: Former Smoker     Packs/day: 2.00     Years: 40.00     Types: Cigarettes    Smokeless tobacco: Never Used    Alcohol use No     Allergies   Allergen Reactions    Namenda [Memantine Hcl]      dizziness     Current Outpatient Prescriptions   Medication Sig Dispense Refill    lisinopril (PRINIVIL;ZESTRIL) 30 MG tablet Take 1 tablet by mouth daily 90 tablet 5    pantoprazole (PROTONIX) 40 MG tablet Take 1 tablet by mouth every morning (before

## 2018-02-07 NOTE — PROGRESS NOTES
Anabel Ji is a 80 y.o. female is here for   Follow up of cad and has stable cad with lad stent and prior possible syncopal and doing well work was negative so far and has had no chest pains . Intensity of the pain none provocation of the pain none , what relieves the pain none , where does  the pain migrates to none  and no nausea no fever and chills and no sob with and without activity.  No evidence of swelling in legs no palpitations and no syncopal episodes and no dizziness ,no bleeding ,or urination  Problems ,no double visions ,no speech problems and no bowel problems or diarrhea and tolerating medications     Patient Active Problem List   Diagnosis    HTN (hypertension), benign    Hyperlipemia    S/P angioplasty with stent    Presence of stent in LAD coronary artery    RCA  stent     Bilateral carotid artery stenosis    Hypertension, uncontrolled    GI bleed    Altered mental status    Weakness    Acute maxillary sinusitis    Epistaxis    UTI (urinary tract infection)    Hypocalcemia    Acute metabolic encephalopathy     Past Medical History:   Diagnosis Date    Anxiety     CAD (coronary artery disease)     Dr Rory Escalera COPD (chronic obstructive [facility] disease) ([facility]     Dementia     Hyperlipidemia     Hypertension     Hypothyroidism     TIA (transient ischemic attack) 3/2013    Urinary incontinence      Social History   Substance Use Topics    Smoking status: Former Smoker     Packs/day: 2.00     Years: 40.00     Types: Cigarettes    Smokeless tobacco: Never Used    Alcohol use No     Allergies   Allergen Reactions    Namenda [Memantine Hcl]      dizziness     Current Outpatient Prescriptions   Medication Sig Dispense Refill    aspirin EC 81 MG EC tablet Take 1 tablet by mouth daily 30 tablet 3    lisinopril (PRINIVIL;ZESTRIL) 30 MG tablet Take 1 tablet by mouth daily 90 tablet 5    pantoprazole (PROTONIX) 40 MG tablet Take 1 tablet by mouth every morning (before breakfast) 30 tablet 3    levothyroxine (SYNTHROID) 100 MCG tablet TAKE ONE TABLET DAILY, BY MOUTH. 90 tablet 0    metoprolol succinate (TOPROL XL) 50 MG extended release tablet TAKE ONE TABLET DAILY, BY MOUTH. 30 tablet 0    ALPRAZolam (XANAX) 1 MG tablet Take 0.5 tablets by mouth 3 times daily as needed for Anxiety . 30 tablet 0    donepezil (ARICEPT) 10 MG tablet TAKE ONE TABLET DAILY AT BEDTIME, BY MOUTH. 90 tablet 3    QUEtiapine (SEROQUEL) 100 MG tablet TAKE ONE TABLET DAILY AT BEDTIME, BY MOUTH. 30 tablet 11    loratadine (CLARITIN) 10 MG tablet Take 1 tablet by mouth daily 30 tablet 11    albuterol (PROVENTIL) (5 MG/ML) 0.5% nebulizer solution Take 1 mL by nebulization 4 times daily 120 each 3    isosorbide mononitrate (IMDUR) 30 MG extended release tablet TAKE ONE TABLET DAILY, BY MOUTH. 90 tablet 3    venlafaxine (EFFEXOR XR) 150 MG extended release capsule TAKE ONE CAPSULE DAILY, BY MOUTH. 30 capsule 11    atorvastatin (LIPITOR) 10 MG tablet TAKE ONE TABLET DAILY, BY MOUTH. 90 tablet 3    sodium chloride (OCEAN) 0.65 % nasal spray 1 spray by Nasal route as needed for Congestion 1 Bottle 3    nitroGLYCERIN (NITROLINGUAL) 0.4 MG/SPRAY spray Place 1 spray under the tongue every 5 minutes as needed for Chest pain. 1 Bottle 3     No current facility-administered medications for this visit. REVIEW OF SYSTEM  Constitutional  symptoms such as fever chills and malaise and weakness  Are negative   HE ENT negative  HEART all negative  LUNGS all negative   ABDOMEN all negative  GENITAL URINARY all within normal limits  NEUROLOGY all negative  NECK all negative   SKIN all negative  MUSCULOSKELETAL negative  HEMATOLOGICAL negative  PSYCHIATRIC  negative    Vitals:    02/07/18 1137 02/07/18 1145   BP: (!) 182/80 (!) 150/80   Pulse: 72    Weight: 147 lb (66.7 kg)    Height: 5' 2\" (1.575 m)        EXAMINATION   Gen.  Appearance is reflective of nutritional normal nutrition and well-groomed   Appears  stated age

## 2018-03-16 NOTE — CARE COORDINATION
Attempted to reach patient for continued Care Coordination follow up and education. Patient was unavailable at the time of my call, and generic voicemail message was left asking patient to please return call to my direct number.   August Benton RN Care Coordinator

## 2018-04-11 PROBLEM — N39.0 UTI (URINARY TRACT INFECTION): Status: RESOLVED | Noted: 2018-01-01 | Resolved: 2018-01-01

## 2018-05-01 PROBLEM — I63.9 STROKE WITH CEREBRAL ISCHEMIA (HCC): Status: ACTIVE | Noted: 2018-01-01

## 2018-05-01 PROBLEM — I63.9 ACUTE CVA (CEREBROVASCULAR ACCIDENT) (HCC): Status: ACTIVE | Noted: 2018-01-01

## 2018-05-02 PROBLEM — I63.512 CEREBROVASCULAR ACCIDENT (CVA) DUE TO OCCLUSION OF LEFT MIDDLE CEREBRAL ARTERY (HCC): Status: ACTIVE | Noted: 2018-01-01

## 2018-05-03 PROBLEM — I63.9 ACUTE ISCHEMIC STROKE (HCC): Status: ACTIVE | Noted: 2018-01-01

## 2019-02-17 NOTE — CARE COORDINATION
Ambulatory Care Coordination Note  2/15/2018  CM Risk Score: 1  Halie Mortality Risk Score: 11.29    ACC: Nasir Perez RN    Summary Note: RNCC briefly met with patient after PCP visit this afternoon. Patient reported she has been doing well. Patient stated her appetite has been good and she denied any further c/o bleeding. Patient denied any current concerns or complaints. Ordered lab work reviewed with patient per PCP request.  PCP stated patient is now receiving medication packets thru [facility] pharmacy and they were called to be sure new medications are added during next fill. [facility] staff stated they will add to future packets and if patient brings in current packets they can add to those too. Patient denied any other questions, concerns, or needs and she was encouraged to call with any that may develop. Attempted to reach patient to update her that if current med packets are brought to the pharmacy they will add new medications. Patient not available and no voicemail available to accept messages. Patient was called and updated.       Care Coordination Interventions    Program Enrollment:  Rising Risk  Referral from Primary Care Provider:  No  Suggested Interventions and Community Resources  Fall Risk Prevention:  Completed  Medication Assistance Program:  Declined (Comment: denied need )  Medi Set or Pill Pack:  Completed (Comment: daughter manages medications for patient)         Goals Addressed             Most Recent     Care Coordination Self Management   Improving (2/15/2018)             CC Self Management Goal  Patient Goal (What steps will patient take to achieve goal?):  To remain active   Patient is able to discuss self-management of condition(s):  Pt demonstrates adherence to medications  Pt demonstrates understanding of self-monitoring  Patient is able to identify Red Flags:  Alert to potential adverse drug reactions(s) or side effects and actions to take should they MD placed nasal tampon Right nare. Continues to have some bleeding.  Given ice water to swish and spit

## 2020-10-28 NOTE — TELEPHONE ENCOUNTER
Please advise Detail Level: Detailed Additional Notes: 4-5/10 acne of Tzone, mostly comedones but some inflammatory.\\n\\nPlays Basketball.\\n\\nStart Cetaphil cleanser to the face morning and nightly \\nPanoxl wash when showering to the back and shoulders.\\nStart Epiduo Forte nightly.\\nGiven Acne & Diet handout.\\n\\nRTC 3 months.\\n